# Patient Record
Sex: FEMALE | Race: WHITE | Employment: OTHER | RURAL
[De-identification: names, ages, dates, MRNs, and addresses within clinical notes are randomized per-mention and may not be internally consistent; named-entity substitution may affect disease eponyms.]

---

## 2017-02-24 ENCOUNTER — OFFICE VISIT (OUTPATIENT)
Dept: CARDIOLOGY CLINIC | Age: 82
End: 2017-02-24

## 2017-02-24 VITALS
RESPIRATION RATE: 14 BRPM | HEART RATE: 57 BPM | SYSTOLIC BLOOD PRESSURE: 128 MMHG | WEIGHT: 94 LBS | DIASTOLIC BLOOD PRESSURE: 64 MMHG | OXYGEN SATURATION: 99 % | HEIGHT: 62 IN | BODY MASS INDEX: 17.3 KG/M2

## 2017-02-24 DIAGNOSIS — I34.0 MITRAL VALVE INSUFFICIENCY, UNSPECIFIED ETIOLOGY: ICD-10-CM

## 2017-02-24 DIAGNOSIS — I48.20 CHRONIC ATRIAL FIBRILLATION (HCC): Primary | ICD-10-CM

## 2017-02-24 DIAGNOSIS — E78.00 PURE HYPERCHOLESTEROLEMIA: ICD-10-CM

## 2017-02-24 DIAGNOSIS — I10 ESSENTIAL HYPERTENSION: ICD-10-CM

## 2017-02-24 DIAGNOSIS — I11.9 BENIGN HYPERTENSIVE HEART DISEASE WITHOUT HEART FAILURE: ICD-10-CM

## 2017-02-24 DIAGNOSIS — I35.1 AORTIC VALVE INSUFFICIENCY, UNSPECIFIED ETIOLOGY: ICD-10-CM

## 2017-02-24 NOTE — PROGRESS NOTES
PATIENT ID VERIFIED WITH TWO PATIENT IDENTIFIERS. MEDICATION REVIEWED AND APPROVED BY DR. Brooke Courtney.

## 2017-02-24 NOTE — MR AVS SNAPSHOT
Visit Information Date & Time Provider Department Dept. Phone Encounter #  
 2/24/2017  1:20 PM Edi Brand, [address] [facility] Cardiology [facility] [phone] [id number] Your Appointments 8/25/2017  2:00 PM  
ESTABLISHED PATIENT with Edi Brand MD  
[address] [facility] Appt Note: 6 mo f/u $0cp 13[address] [facility] [address] [address] [phone]  
  
   
 [address] [place] [id number] Upcoming Health Maintenance Date Due DTaP/Tdap/Td series (1 - Tdap) 10/6/1953 ZOSTER VACCINE AGE 60> 10/6/1992 GLAUCOMA SCREENING Q2Y 10/6/1997 OSTEOPOROSIS SCREENING (DEXA) 10/6/1997 Pneumococcal 65+ Low/Medium Risk (1 of 2 - PCV13) 10/6/1997 MEDICARE YEARLY EXAM 10/6/1997 INFLUENZA AGE 9 TO ADULT 8/1/2016 Allergies as of 2/24/2017  Review Complete On: 2/24/2017 By: Edi Brand MD  
  
 Severity Noted Reaction Type Reactions Flagyl [Metronidazole]  12/04/2013    Nausea and Vomiting Penicillins  12/04/2013    Nausea and Vomiting Current Immunizations  Never Reviewed Name Date  
 TB Skin Test (PPD) Intradermal  Incomplete Not reviewed this visit You Were Diagnosed With   
  
 Codes Comments Chronic atrial fibrillation (HCC)    -  Primary ICD-10-CM: I89.7 ICD-9-CM: 427.31 Benign hypertensive heart disease without heart failure     ICD-10-CM: I11.9 ICD-9-CM: 402.10 Aortic valve insufficiency, unspecified etiology     ICD-10-CM: I35.1 ICD-9-CM: 424.1 Mitral valve insufficiency, unspecified etiology     ICD-10-CM: I34.0 ICD-9-CM: 424.0 Pure hypercholesterolemia     ICD-10-CM: E78.00 ICD-9-CM: 272.0 Essential hypertension     ICD-10-CM: I10 
ICD-9-CM: 401.9 Vitals BP  
  
  
  
  
  
 128/64 (BP 1 Location: Right arm, BP Patient Position: Sitting) BMI and BSA Data Body Mass Index Body Surface Area 17.19 kg/m 2 1.37 m 2 Preferred Pharmacy Pharmacy Name Phone [address], [address] [facility] AT [facility] OF  [address] & [address] MEM[address]adi Larsen [phone] Your Updated Medication List  
  
   
This list is accurate as of: 2/24/17  2:13 PM.  Always use your most recent med list.  
  
  
  
  
 ALIGN 4 mg Cap Generic drug:  Bifidobacterium Infantis Take  by mouth. atorvastatin 20 mg tablet Commonly known as:  LIPITOR Take 1 Tab by mouth daily. Azelastine 0.15 % (205.5 mcg) nasal spray Commonly known as:  ASTEPRO  
two (2) times a day. calcium carbonate 200 mg calcium (500 mg) Chew Commonly known as:  TUMS Take 1 Tab by mouth as needed. CLARITIN 10 mg tablet Generic drug:  loratadine Take 10 mg by mouth daily. EPIPEN 0.3 mg/0.3 mL injection Generic drug:  EPINEPHrine  
0.3 mg by IntraMUSCular route once as needed. fluorometholone 0.1 % ophthalmic suspension Commonly known as: [facility] Administer 1 Drop to both eyes two (2) times a day. levothyroxine 25 mcg tablet Commonly known as:  SYNTHROID Take 25 mcg by mouth two (2) times a day. LOPRESSOR PO Take 25 mg by mouth as needed. melatonin 3 mg tablet Take  by mouth nightly. metoprolol succinate 50 mg XL tablet Commonly known as:  TOPROL-XL Take 50 mg by mouth two (2) times a day. multivitamin tablet Commonly known as:  ONE A DAY Take 1 Tab by mouth daily. omeprazole 20 mg capsule Commonly known as:  PRILOSEC Take 20 mg by mouth daily. Takes 40mg a day OTHER  
AMITIZA TWO TABLETS, MIRALAX, CITRACEL FOR BOWELS  Currently taking Miralax only TYLENOL EXTRA STRENGTH 500 mg tablet Generic drug:  acetaminophen Take  by mouth every six (6) hours as needed for Pain. VIACTIV PO Take  by mouth nightly. VITAMIN D3 1,000 unit Cap Generic drug:  cholecalciferol Take  by mouth daily. Taking 5000 units VOLTAREN 1 % Gel Generic drug:  diclofenac Apply  to affected area four (4) times daily. XANAX 0.25 mg tablet Generic drug:  ALPRAZolam  
Take 0.5 mg by mouth nightly. XARELTO 15 mg Tab tablet Generic drug:  rivaroxaban Take  by mouth daily. We Performed the Following AMB POC EKG ROUTINE W/ 12 LEADS, INTER & REP [37737 CPT(R)] Introducing [facility] & HEALTH SERVICES! Navya Quinones introduces [employer] patient portal. Now you can access parts of your medical record, email your doctor's office, and request medication refills online. 1. In your internet browser, go to [email or web address] 2. Click on the First Time User? Click Here link in the Sign In box. You will see the New Member Sign Up page. 3. Enter your Intelli[employer] Access Code exactly as it appears below. You will not need to use this code after youve completed the sign-up process. If you do not sign up before the expiration date, you must request a new code. · [employer]elli[employer] Access Code: [email or web address] Expires: 5/25/2017  2:13 PM 
 
4. Enter the last four digits of your Social Security Number (xxxx) and Date of Birth (mm/dd/yyyy) as indicated and click Submit. You will be taken to the next sign-up page. 5. Create a IntelliChem ID. This will be your IntelliChem login ID and cannot be changed, so think of one that is secure and easy to remember. 6. Create a IntelliChem password. You can change your password at any time. 7. Enter your Password Reset Question and Answer. This can be used at a later time if you forget your password. 8. Enter your e-mail address. You will receive e-mail notification when new information is available in [address] 9. Click Sign Up. You can now view and download portions of your medical record. 10. Click the Download Summary menu link to download a portable copy of your medical information. If you have questions, please visit the Frequently Asked Questions section of the P[employer] website. Remember, [employer] is NOT to be used for urgent needs. For medical emergencies, dial 911. Now available from your iPhone and Android! Please provide this summary of care documentation to your next provider. Your primary care clinician is listed as Orlan Halsted. If you have any questions after today's visit, please call [phone].

## 2017-02-24 NOTE — PROGRESS NOTES
Corey Randall is a 80 y.o. female is here for routine f/u. No CV sx or complaints. Rate controlled afib on anticoag. Prior GI w/u. Had recent episode of AMS--seen by PCP and had CT ?MRI (Dr. Andry Sierra). The patient denies chest pain/ shortness of breath, orthopnea, PND, LE edema, palpitations, syncope, presyncope or fatigue. Patient Active Problem List    Diagnosis Date Noted    Constipation     Snoring     Carotid bruit 07/26/2012    Atrial fibrillation (HCC)     Aortic insufficiency     Benign hypertensive heart disease without heart failure     Pure hypercholesterolemia     Mitral insufficiency     Hypothyroid       Porfirio Blevins MD (Inactive)  Past Medical History:   Diagnosis Date    Anxiety disorder     Aortic insufficiency     Atrial fibrillation (HCC)     Warfarin stopped by PCP for lung lesion    Benign hypertensive heart disease without heart failure     Carotid bruit 7/26/2012    Constipation     Essential hypertension     Heart disease     Hypothyroid     Memory disorder     Mesothelioma ([facility] [address]     Mitral insufficiency     Pure hypercholesterolemia     Snoring     Unspecified cerebral artery occlusion with cerebral infarction [facility])       Past Surgical History:   Procedure Laterality Date    ECHO 2D ADULT  2009    mild LV systolic dysfunction, normal chamber dimensions, mild aortic regurgitation and mild mitral regurgitation. The ejection fraction was 45-50%.  HX APPENDECTOMY      HX CATARACT REMOVAL      HX TONSILLECTOMY       Allergies   Allergen Reactions    Flagyl [Metronidazole] Nausea and Vomiting    Penicillins Nausea and Vomiting      Family History   Problem Relation Age of Onset    Stroke Mother     Stroke Father       Social History     Social History    Marital status:      Spouse name: N/A    Number of children: N/A    Years of education: N/A     Occupational History    Not on file.      Social History Main Topics    Smoking status: Never Smoker    Smokeless tobacco: Not on file    Alcohol use No    Drug use: No    Sexual activity: Not on file     Other Topics Concern    Not on file     Social History Narrative      Current Outpatient Prescriptions   Medication Sig    calcium carbonate (TUMS) 200 mg calcium (500 mg) chew Take 1 Tab by mouth as needed.  acetaminophen (TYLENOL EXTRA STRENGTH) 500 mg tablet Take  by mouth every six (6) hours as needed for Pain.  Azelastine (ASTEPRO) 0.15 % (205.5 mcg) nasal spray two (2) times a day.  fluorometholone (FML) 0.1 % ophthalmic suspension Administer 1 Drop to both eyes three (3) times daily.  cranberry extract (AZO CRANBERRY) 450 mg tab Take  by mouth daily.  VIT A/VIT C/VIT E/ZINC/COPPER (ICAPS AREDS PO) Take  by mouth.  diclofenac (VOLTAREN) 1 % gel Apply  to affected area four (4) times daily.  EPINEPHrine (EPIPEN) 0.3 mg/0.3 mL injection 0.3 mg by IntraMUSCular route once as needed.  OTHER AMITIZA TWO TABLETS, MIRALAX, CITRACEL FOR BOWELS   Currently taking Miralax only    multivitamin (ONE A DAY) tablet Take 1 Tab by mouth daily.  loratadine (CLARITIN) 10 mg tablet Take 10 mg by mouth daily.  Bifidobacterium Infantis (ALIGN) 4 mg cap Take  by mouth.  rivaroxaban (XARELTO) 15 mg tab tablet Take  by mouth daily.  atorvastatin (LIPITOR) 20 mg tablet Take 1 Tab by mouth daily.  omeprazole (PRILOSEC) 20 mg capsule Take 20 mg by mouth daily. Takes 40mg a day    melatonin 3 mg tablet Take  by mouth.  metoprolol succinate (TOPROL-XL) 50 mg XL tablet Take 50 mg by mouth two (2) times a day.  Cholecalciferol, Vitamin D3, (VITAMIN D3) 1,000 unit cap Take  by mouth daily. Taking 5000 units    METOPROLOL TARTRATE (LOPRESSOR PO) Take 25 mg by mouth as needed.  ALPRAZolam (XANAX) 0.25 mg tablet Take 0.5 mg by mouth nightly.  CA CARBONATE/VITAMIN D3/VIT K (VIACTIV PO) Take  by mouth nightly.     levothyroxine (SYNTHROID) 25 mcg tablet Take 25 mcg by mouth two (2) times a day. No current facility-administered medications for this visit. Review of Symptoms:    CONST  No weight change. No fever, chills, sweats    ENT No visual changes, URI sx, sore throat    CV  See HPI   RESP  No cough, or sputum, wheezing. Also see HPI   GI  No abdominal pain or change in bowel habits. No heartburn or dysphagia. No melena or rectal bleeding.   No dysuria, urgency, frequency, hematuria   MSKEL  No joint pain, swelling. No muscle pain. SKIN  No rash or lesions. NEURO  No headache, syncope, or seizure. No weakness, loss of sensation, or paresthesias. PSYCH  No low mood or depression  No anxiety. HE/LYMPH  No easy bruising, abnormal bleeding, or enlarged glands. Physical ExamPhysical Exam:    Visit Vitals    /64 (BP 1 Location: Right arm, BP Patient Position: Sitting)    Pulse (!) 57    Resp 14    Ht 5' 2\" (1.575 m)    Wt 94 lb (42.6 kg)    SpO2 99%    BMI 17.19 kg/m2     Gen: NAD  HEENT:  PERRL, throat clear  Neck: no mass or thyromegaly, no JVD   Heart:  irregular,Nl S1S2,  I/VI murmur, no gallop or rub.   Lungs:  clear  Abdomen:   Soft, non-tender, bowel sounds are active.   Extremities:  No edema  Pulse: symmetric  Neuro: A&O times 3, WNL      Cardiographics    ECG: afib, rate controlled, NSST    CARDIAC TESTING    ECHO 12/2013:  EF 55-60%, sev LAE, mod FREDDIE, 1-2+ AI, mild MR, mild TR, RVSP 41      Labs:   No results found for: NA, K, CL, CO2, AGAP, GLU, BUN, CREA, BUCR, GFRAA, GFRNA, CA, TBIL, TBILI, GPT, SGOT, AP, TP, ALB, GLOB, AGRAT, ALT  No results found for: CPK, CPKX, CPX  No results found for: CHOL, CHOLX, CHLST, CHOLV, 403601, HDL, LDL, DLDL, LDLC, DLDLP, TGL, TGLX, TRIGL, [id number], TRIGP, CHHD, CHHDX  No results found for this or any previous visit.     Assessment:         Patient Active Problem List    Diagnosis Date Noted    Constipation     Snoring     Carotid bruit 07/26/2012    Atrial fibrillation ([facility] [address]     Aortic insufficiency     Benign hypertensive heart disease without heart failure     Pure hypercholesterolemia     Mitral insufficiency     Hypothyroid       No CV sx or complaints. Rate controlled afib on anticoag. Prior GI w/u neg, c/w IBS. Continues to see PCP. Had recent episode of AMS--seen by PCP and had CT ?MRI (Dr. Alexy Mcgee). Plan:     Doing well with no adverse cardiac symptoms. Lipids and labs followed by PCP. Continue current care and f/u in 6 months.      Carmen Martinez MD

## 2017-08-22 ENCOUNTER — OFFICE VISIT (OUTPATIENT)
Dept: CARDIOLOGY CLINIC | Age: 82
End: 2017-08-22

## 2017-08-22 VITALS
DIASTOLIC BLOOD PRESSURE: 76 MMHG | HEART RATE: 49 BPM | WEIGHT: 91.2 LBS | OXYGEN SATURATION: 100 % | SYSTOLIC BLOOD PRESSURE: 148 MMHG | HEIGHT: 62 IN | BODY MASS INDEX: 16.78 KG/M2 | RESPIRATION RATE: 16 BRPM

## 2017-08-22 DIAGNOSIS — I35.1 AORTIC VALVE REGURGITATION, UNSPECIFIED ETIOLOGY: ICD-10-CM

## 2017-08-22 DIAGNOSIS — I34.0 MITRAL VALVE INSUFFICIENCY, UNSPECIFIED ETIOLOGY: ICD-10-CM

## 2017-08-22 DIAGNOSIS — I48.20 CHRONIC ATRIAL FIBRILLATION (HCC): Primary | ICD-10-CM

## 2017-08-22 DIAGNOSIS — I11.9 BENIGN HYPERTENSIVE HEART DISEASE WITHOUT HEART FAILURE: ICD-10-CM

## 2017-08-22 DIAGNOSIS — I10 ESSENTIAL HYPERTENSION: ICD-10-CM

## 2017-08-22 RX ORDER — POLYETHYLENE GLYCOL 3350 17 G/17G
17 POWDER, FOR SOLUTION ORAL AS NEEDED
COMMUNITY

## 2017-08-22 NOTE — MR AVS SNAPSHOT
Visit Information Date & Time Provider Department Dept. Phone Encounter #  
 8/22/2017 11:40 AM Vika Galeano, [address] [facility] Cardiology [facility] [phone] [id number] Your Appointments 3/20/2018  3:00 PM  
ESTABLISHED PATIENT with Vika Galeano MD  
[address] - [facility]) Appt Note: 6 mo fu $0cp [address] [facility] [place] [id number] [phone]  
  
   
 [address] [place] [address] Upcoming Health Maintenance Date Due DTaP/Tdap/Td series (1 - Tdap) 10/6/1953 ZOSTER VACCINE AGE 60> 8/6/1992 GLAUCOMA SCREENING Q2Y 10/6/1997 OSTEOPOROSIS SCREENING (DEXA) 10/6/1997 Pneumococcal 65+ Low/Medium Risk (1 of 2 - PCV13) 10/6/1997 MEDICARE YEARLY EXAM 10/6/1997 INFLUENZA AGE 9 TO ADULT 8/1/2017 Allergies as of 8/22/2017  Review Complete On: 8/22/2017 By: Vika Galeano MD  
  
 Severity Noted Reaction Type Reactions Flagyl [Metronidazole]  12/04/2013    Nausea and Vomiting Penicillins  12/04/2013    Nausea and Vomiting Current Immunizations  Never Reviewed Name Date  
 TB Skin Test (PPD) Intradermal  Incomplete Not reviewed this visit You Were Diagnosed With   
  
 Codes Comments [facility]rial fibrillation, unspecified type ([facility]    -  Primary ICD-10-CM: I48.91 
ICD-9-CM: 427.31 Benign hypertensive heart disease without heart failure     ICD-10-CM: I11.9 ICD-9-CM: 402.10 Aortic valve regurgitation, unspecified etiology     ICD-10-CM: I35.1 ICD-9-CM: 424.1 Essential hypertension     ICD-10-CM: I10 
ICD-9-CM: 401.9 Mitral valve insufficiency, unspecified etiology     ICD-10-CM: I34.0 ICD-9-CM: 424.0 Vitals BP Pulse Resp Height(growth percentile) Weight(growth percentile) SpO2  
 148/76 (BP 1 Location: Right arm, BP Patient Position: Sitting) (!) 49 16 5' 2\" (1.575 m) 91 lb 3.2 oz (41.4 kg) 100% BMI Smoking Status 16.68 kg/m2 Never Smoker Vitals History BMI and BSA Data Body Mass Index Body Surface Area  
 16.68 kg/m 2 1.35 m 2 Preferred Pharmacy Pharmacy Name Phone [address], [address] AT [facility] OF  [address] & [address] [phone] Your Updated Medication List  
  
   
This list is accurate as of: 8/22/17 12:13 PM.  Always use your most recent med list.  
  
  
  
  
 ALIGN 4 mg Cap Generic drug:  Bifidobacterium Infantis Take  by mouth. atorvastatin 20 mg tablet Commonly known as:  LIPITOR Take 1 Tab by mouth daily. Azelastine 0.15 % (205.5 mcg) nasal spray Commonly known as:  ASTEPRO  
two (2) times a day. calcium carbonate 200 mg calcium (500 mg) Chew Commonly known as:  TUMS Take 1 Tab by mouth as needed. CITRACAL PO Take  by mouth. CLARITIN 10 mg tablet Generic drug:  loratadine Take 10 mg by mouth daily. EPIPEN 0.3 mg/0.3 mL injection Generic drug:  EPINEPHrine  
0.3 mg by IntraMUSCular route once as needed. FIBER PO Take  by mouth. fluorometholone 0.1 % ophthalmic suspension Commonly known as: [address] Administer 1 Drop to both eyes two (2) times a day. levothyroxine 25 mcg tablet Commonly known as:  SYNTHROID Take 50 mcg by mouth Daily (before breakfast). LOPRESSOR PO Take 25 mg by mouth as needed. melatonin 3 mg tablet Take  by mouth nightly. metoprolol succinate 50 mg XL tablet Commonly known as:  TOPROL-XL Take 50 mg by mouth two (2) times a day. MIRALAX 17 gram packet Generic drug:  polyethylene glycol Take 17 g by mouth daily. multivitamin tablet Commonly known as:  ONE A DAY Take 1 Tab by mouth daily. omeprazole 20 mg capsule Commonly known as:  PRILOSEC Take 20 mg by mouth daily. Takes 40mg a day OTHER  
AMITIZA one TABLET DAILY  
  
 TYLENOL EXTRA STRENGTH 500 mg tablet Generic drug:  acetaminophen Take  by mouth every six (6) hours as needed for Pain. VIACTIV PO Take  by mouth nightly. VITAMIN D3 1,000 unit Cap Generic drug:  cholecalciferol Take  by mouth daily. Taking 5000 units VOLTAREN 1 % Gel Generic drug:  diclofenac Apply  to affected area four (4) times daily. XANAX 0.25 mg tablet Generic drug:  ALPRAZolam  
Take 0.5 mg by mouth nightly. XARELTO 15 mg Tab tablet Generic drug:  rivaroxaban Take  by mouth daily. We Performed the Following AMB POC EKG ROUTINE W/ 12 LEADS, INTER & REP [20866 CPT(R)] Introducing [facility] & HEALTH SERVICES! Chema Lewis introduces [employer] patient portal. Now you can access parts of your medical record, email your doctor's office, and request medication refills online. 1. In your internet browser, go to [email or web address] 2. Click on the First Time User? Click Here link in the Sign In box. You will see the New Member Sign Up page. 3. Enter your Partly Access Code exactly as it appears below. You will not need to use this code after youve completed the sign-up process. If you do not sign up before the expiration date, you must request a new code. · [employer] Access Code: [email or web address] Expires: 11/20/2017 12:13 PM 
 
4. Enter the last four digits of your Social Security Number (xxxx) and Date of Birth (mm/dd/yyyy) as indicated and click Submit. You will be taken to the next sign-up page. 5. Create a [employer] ID. This will be your Partly login ID and cannot be changed, so think of one that is secure and easy to remember. 6. Create a Partly password. You can change your password at any time. 7. Enter your Password Reset Question and Answer. This can be used at a later time if you forget your password. 8. Enter your e-mail address. You will receive e-mail notification when new information is available in [address] 9. Click Sign Up. You can now view and download portions of your medical record. 10. Click the Download Summary menu link to download a portable copy of your medical information. If you have questions, please visit the Frequently Asked Questions section of the [employer] website. Remember, [employer] is NOT to be used for urgent needs. For medical emergencies, dial 911. Now available from your iPhone and Android! Please provide this summary of care documentation to your next provider. Your primary care clinician is listed as Jeremias Saez. If you have any questions after today's visit, please call [phone].

## 2017-08-22 NOTE — PROGRESS NOTES
Amy Pedro is a 80 y.o. female is here for routine f/u. No CV sx or complaints. Continues to see PCP. Rate controlled AFib on anticoag. Has had to take \"extra\" metoprolol tartrate on occasion for tachy. The patient denies chest pain/ shortness of breath, orthopnea, PND, LE edema, palpitations, syncope, presyncope or fatigue. Patient Active Problem List    Diagnosis Date Noted    Constipation     Snoring     Carotid bruit 07/26/2012    Atrial fibrillation (HCC)     Aortic insufficiency     Benign hypertensive heart disease without heart failure     Pure hypercholesterolemia     Mitral insufficiency     Hypothyroid       Prema Gonzales MD (Inactive)  Past Medical History:   Diagnosis Date    Anxiety disorder     Aortic insufficiency     Atrial fibrillation (HCC)     Warfarin stopped by PCP for lung lesion    Benign hypertensive heart disease without heart failure     Carotid bruit 7/26/2012    Constipation     Essential hypertension     Heart disease     Hypothyroid     Memory disorder     Mesothelioma ([facility] [address]     Mitral insufficiency     Pure hypercholesterolemia     Snoring     Unspecified cerebral artery occlusion with cerebral infarction [facility])       Past Surgical History:   Procedure Laterality Date    ECHO 2D ADULT  2009    mild LV systolic dysfunction, normal chamber dimensions, mild aortic regurgitation and mild mitral regurgitation. The ejection fraction was 45-50%.  HX APPENDECTOMY      HX CATARACT REMOVAL      HX TONSILLECTOMY       Allergies   Allergen Reactions    Flagyl [Metronidazole] Nausea and Vomiting    Penicillins Nausea and Vomiting      Family History   Problem Relation Age of Onset    Stroke Mother     Stroke Father       Social History     Social History    Marital status:      Spouse name: N/A    Number of children: N/A    Years of education: N/A     Occupational History    Not on file.      Social History Main Topics    Smoking status: Never Smoker    Smokeless tobacco: Not on file    Alcohol use No    Drug use: No    Sexual activity: Not on file     Other Topics Concern    Not on file     Social History Narrative      Current Outpatient Prescriptions   Medication Sig    PSYLLIUM SEED, WITH DEXTROSE, (FIBER PO) Take  by mouth.  polyethylene glycol (MIRALAX) 17 gram packet Take 17 g by mouth daily.  CALCIUM CITRATE (CITRACAL PO) Take  by mouth.  calcium carbonate (TUMS) 200 mg calcium (500 mg) chew Take 1 Tab by mouth as needed.  acetaminophen (TYLENOL EXTRA STRENGTH) 500 mg tablet Take  by mouth every six (6) hours as needed for Pain.  Azelastine (ASTEPRO) 0.15 % (205.5 mcg) nasal spray two (2) times a day.  fluorometholone (FML) 0.1 % ophthalmic suspension Administer 1 Drop to both eyes two (2) times a day.  diclofenac (VOLTAREN) 1 % gel Apply  to affected area four (4) times daily.  EPINEPHrine (EPIPEN) 0.3 mg/0.3 mL injection 0.3 mg by IntraMUSCular route once as needed.  OTHER AMITIZA one TABLET DAILY    multivitamin (ONE A DAY) tablet Take 1 Tab by mouth daily.  loratadine (CLARITIN) 10 mg tablet Take 10 mg by mouth daily.  Bifidobacterium Infantis (ALIGN) 4 mg cap Take  by mouth.  rivaroxaban (XARELTO) 15 mg tab tablet Take  by mouth daily.  atorvastatin (LIPITOR) 20 mg tablet Take 1 Tab by mouth daily.  omeprazole (PRILOSEC) 20 mg capsule Take 20 mg by mouth daily. Takes 40mg a day    melatonin 3 mg tablet Take  by mouth nightly.  metoprolol succinate (TOPROL-XL) 50 mg XL tablet Take 50 mg by mouth two (2) times a day.  Cholecalciferol, Vitamin D3, (VITAMIN D3) 1,000 unit cap Take  by mouth daily. Taking 5000 units    METOPROLOL TARTRATE (LOPRESSOR PO) Take 25 mg by mouth as needed.  ALPRAZolam (XANAX) 0.25 mg tablet Take 0.5 mg by mouth nightly.  CA CARBONATE/VITAMIN D3/VIT K (VIACTIV PO) Take  by mouth nightly.     levothyroxine (SYNTHROID) 25 mcg tablet Take 50 mcg by mouth Daily (before breakfast). No current facility-administered medications for this visit. Review of Symptoms:    CONST  No weight change. No fever, chills, sweats    ENT No visual changes, URI sx, sore throat    CV  See HPI   RESP  No cough, or sputum, wheezing. Also see HPI   GI  No abdominal pain or change in bowel habits. No heartburn or dysphagia. No melena or rectal bleeding.   No dysuria, urgency, frequency, hematuria   MSKEL  No joint pain, swelling. No muscle pain. SKIN  No rash or lesions. NEURO  No headache, syncope, or seizure. No weakness, loss of sensation, or paresthesias. PSYCH  No low mood or depression  No anxiety. HE/LYMPH  No easy bruising, abnormal bleeding, or enlarged glands. Physical ExamPhysical Exam:    Visit Vitals    Resp 16    Ht 5' 2\" (1.575 m)    Wt 91 lb 3.2 oz (41.4 kg)    BMI 16.68 kg/m2     Gen: NAD  HEENT:  PERRL, throat clear  Neck: no adenopathy, no thyromegaly, no JVD   Heart:  irregular,Nl S1S2,  II/VI murmur, no gallop or rub.   Lungs:  clear  Abdomen:   Soft, non-tender, bowel sounds are active.   Extremities:  No edema  Pulse: symmetric  Neuro: A&O times 3, No focal neuro deficits    Cardiographics    ECG:  afib 49, LVH, NSST    CARDIAC TESTING    ECHO 12/2013:  EF 55-60%, sev LAE, mod FREDDIE, 1-2+ AI, mild MR, mild TR, RVSP 41     ECHO 4/2015 EF 55-60, LAE, mild AI.MR/TR. Assessment:         Patient Active Problem List    Diagnosis Date Noted    Constipation     Snoring     Carotid bruit 07/26/2012    Atrial fibrillation (HCC)     Aortic insufficiency     Benign hypertensive heart disease without heart failure     Pure hypercholesterolemia     Mitral insufficiency     Hypothyroid       No CV sx or complaints. Continues to see PCP. Rate controlled AFib on anticoag. Has had to take \"extra\" metoprolol tartrate on occasion for tachy. Plan:     Doing well with no adverse cardiac symptoms. Home monitoring BP. Lipids and labs followed by PCP. Continue current care and f/u in 6 months.     Chevy Trevino MD

## 2018-03-20 ENCOUNTER — OFFICE VISIT (OUTPATIENT)
Dept: CARDIOLOGY CLINIC | Age: 83
End: 2018-03-20

## 2018-03-20 VITALS
HEART RATE: 52 BPM | HEIGHT: 63 IN | BODY MASS INDEX: 17.36 KG/M2 | SYSTOLIC BLOOD PRESSURE: 156 MMHG | WEIGHT: 98 LBS | OXYGEN SATURATION: 100 % | DIASTOLIC BLOOD PRESSURE: 80 MMHG | RESPIRATION RATE: 14 BRPM

## 2018-03-20 DIAGNOSIS — I35.1 AORTIC VALVE INSUFFICIENCY, ETIOLOGY OF CARDIAC VALVE DISEASE UNSPECIFIED: ICD-10-CM

## 2018-03-20 DIAGNOSIS — I48.20 CHRONIC ATRIAL FIBRILLATION (HCC): Primary | ICD-10-CM

## 2018-03-20 DIAGNOSIS — I34.0 MITRAL VALVE INSUFFICIENCY, UNSPECIFIED ETIOLOGY: ICD-10-CM

## 2018-03-20 DIAGNOSIS — I11.9 BENIGN HYPERTENSIVE HEART DISEASE WITHOUT HEART FAILURE: ICD-10-CM

## 2018-03-20 DIAGNOSIS — I10 ESSENTIAL HYPERTENSION: ICD-10-CM

## 2018-03-20 RX ORDER — AMLODIPINE BESYLATE 5 MG/1
5 TABLET ORAL DAILY
Qty: 90 TAB | Refills: 3 | Status: SHIPPED | OUTPATIENT
Start: 2018-03-20 | End: 2019-03-08 | Stop reason: SDUPTHER

## 2018-03-20 RX ORDER — IPRATROPIUM BROMIDE 21 UG/1
2 SPRAY, METERED NASAL DAILY
COMMUNITY
End: 2022-09-14

## 2018-03-20 RX ORDER — AMLODIPINE BESYLATE 2.5 MG/1
TABLET ORAL DAILY
COMMUNITY
End: 2018-03-20 | Stop reason: SDUPTHER

## 2018-03-20 NOTE — MR AVS SNAPSHOT
[facility] 
 
 
 [address] [facility] [address] [id number] [phone] Patient: Margaret Middleton MRN: [id number] Visit Information Date & Time Provider Department Dept. Phone Encounter #  
 3/20/2018  3:00 PM Kelly Velasquez, [address] [place] [address] Cardiology [facility] [phone] [id number] Follow-up Instructions Return in about 6 months (around 2018). Follow-up and Disposition History Upcoming Health Maintenance Date Due DTaP/Tdap/Td series (1 - Tdap) 10/6/1953 ZOSTER VACCINE AGE 60> 1992 GLAUCOMA SCREENING Q2Y 10/6/1997 Bone Densitometry (Dexa) Screening 10/6/1997 Pneumococcal 65+ Low/Medium Risk (1 of 2 - PCV13) 10/6/1997 MEDICARE YEARLY EXAM 3/20/2018 Allergies as of 3/20/2018  Review Complete On: 3/20/2018 By: Kelly Velasquez MD  
  
 Severity Noted Reaction Type Reactions Flagyl [Metronidazole]  2013    Nausea and Vomiting Penicillins  2013    Nausea and Vomiting Current Immunizations  Never Reviewed Name Date  
 TB Skin Test (PPD) Intradermal  Incomplete Not reviewed this visit You Were Diagnosed With   
  
 Codes Comments Chronic atrial fibrillation (HCC)    -  Primary ICD-10-CM: Z90.5 ICD-9-CM: 427.31 Essential hypertension     ICD-10-CM: I10 
ICD-9-CM: 401.9 Benign hypertensive heart disease without heart failure     ICD-10-CM: I11.9 ICD-9-CM: 402.10 Mitral valve insufficiency, unspecified etiology     ICD-10-CM: I34.0 ICD-9-CM: 424.0 Aortic valve insufficiency, etiology of cardiac valve disease unspecified     ICD-10-CM: I35.1 ICD-9-CM: 424.1 Vitals BP Pulse Resp Height(growth percentile) Weight(growth percentile) SpO2  
 156/80 (BP 1 Location: Right arm, BP Patient Position: Sitting) (!) 52 14 5' 3\" (1.6 m) 98 lb (44.5 kg) 100% BMI Smoking Status 17.36 kg/m2 Never Smoker Vitals History BMI and BSA Data Body Mass Index Body Surface Area  
 17.36 kg/m 2 1.41 m 2 Preferred Pharmacy Pharmacy Name Phone [address] [facility] [phone] Your Updated Medication List  
  
   
This list is accurate as of 3/20/18  3:40 PM.  Always use your most recent med list.  
  
  
  
  
 ALIGN 4 mg Cap Generic drug:  Bifidobacterium Infantis Take  by mouth daily. amLODIPine 5 mg tablet Commonly known as:  Claudell Luzmaria Take 1 Tab by mouth daily. atorvastatin 20 mg tablet Commonly known as:  LIPITOR Take 1 Tab by mouth daily. Azelastine 0.15 % (205.5 mcg) nasal spray Commonly known as:  ASTEPRO  
two (2) times a day. calcium carbonate 200 mg calcium (500 mg) Chew Commonly known as:  TUMS Take 1 Tab by mouth as needed. CLARITIN 10 mg tablet Generic drug:  loratadine Take 10 mg by mouth daily. EPIPEN 0.3 mg/0.3 mL injection Generic drug:  EPINEPHrine  
0.3 mg by IntraMUSCular route once as needed. FIBER PO Take  by mouth every fourty-eight (48) hours. fluorometholone 0.1 % ophthalmic suspension Commonly known as: [facility] Administer 1 Drop to both eyes daily. ipratropium 0.03 % nasal spray Commonly known as:  ATROVENT  
2 Sprays every twelve (12) hours. levothyroxine 25 mcg tablet Commonly known as:  SYNTHROID Take 50 mcg by mouth Daily (before breakfast). LOPRESSOR PO Take 25 mg by mouth as needed. Takes if blood pressure is in excess of 628 systolic  
  
 melatonin 3 mg tablet Take  by mouth nightly. Takes for 3 weeks then stops a week before restarting  
  
 metoprolol succinate 50 mg XL tablet Commonly known as:  TOPROL-XL Take 50 mg by mouth two (2) times a day. MIRALAX 17 gram packet Generic drug:  polyethylene glycol Take 17 g by mouth daily. multivitamin tablet Commonly known as:  ONE A DAY Take 1 Tab by mouth daily. omeprazole 20 mg capsule Commonly known as:  PRILOSEC Take 20 mg by mouth daily. Takes 40mg a day OTHER  
AMITIZA one TABLET DAILY [place] Perish OP Apply  to eye three (3) times daily. TYLENOL EXTRA STRENGTH 500 mg tablet Generic drug:  acetaminophen Take  by mouth every six (6) hours as needed for Pain. VIACTIV PO Take  by mouth nightly. VITAMIN D3 1,000 unit Cap Generic drug:  cholecalciferol Take  by mouth daily. Taking 5000 units VOLTAREN 1 % Gel Generic drug:  diclofenac Apply  to affected area four (4) times daily. XANAX 0.25 mg tablet Generic drug:  ALPRAZolam  
Take 0.5 mg by mouth nightly. XARELTO 15 mg Tab tablet Generic drug:  rivaroxaban Take  by mouth daily. Prescriptions Sent to Pharmacy Refills  
 amLODIPine (NORVASC) 5 mg tablet 3 Sig: Take 1 Tab by mouth daily. Class: Normal  
 Pharmacy: [address], [address] Ph #: [phone] Route: Oral  
  
We Performed the Following AMB POC EKG ROUTINE W/ 12 LEADS, INTER & REP [01177 CPT(R)] Follow-up Instructions Return in about 6 months (around 9/20/2018). Introducing hospitals & HEALTH SERVICES! Carmen Ornelas introduces [employer] patient portal. Now you can access parts of your medical record, email your doctor's office, and request medication refills online. 1. In your internet browser, go to [email or web address] 2. Click on the First Time User? Click Here link in the Sign In box. You will see the New Member Sign Up page. 3. Enter your [employer] Access Code exactly as it appears below. You will not need to use this code after youve completed the sign-up process. If you do not sign up before the expiration date, you must request a new code. · [employer] Access Code: [email or web address] Expires: 6/18/2018  3:40 PM 
 
 4. Enter the last four digits of your Social Security Number (xxxx) and Date of Birth (mm/dd/yyyy) as indicated and click Submit. You will be taken to the next sign-up page. 5. Create a [employer] ID. This will be your [employer] login ID and cannot be changed, so think of one that is secure and easy to remember. 6. Create a [employer] password. You can change your password at any time. 7. Enter your Password Reset Question and Answer. This can be used at a later time if you forget your password. 8. Enter your e-mail address. You will receive e-mail notification when new information is available in [address] 9. Click Sign Up. You can now view and download portions of your medical record. 10. Click the Download Summary menu link to download a portable copy of your medical information. If you have questions, please visit the Frequently Asked Questions section of the [employer] website. Remember, [employer] is NOT to be used for urgent needs. For medical emergencies, dial 911. Now available from your iPhone and Android! Please provide this summary of care documentation to your next provider. Your primary care clinician is listed as Dock Rob. If you have any questions after today's visit, please call [phone].

## 2018-03-20 NOTE — PROGRESS NOTES
PATIENT ID VERIFIED WITH TWO PATIENT IDENTIFIERS. PATIENT MEDICATIONS REVIEWED AND APPROVED BY DR. Adam Aquino. MEDICATIONS THAT WERE REMOVED FROM THIS VISIT HAVE BEEN APPROVED BY DR. Adam Aquino. Chief Complaint   Patient presents with    Irregular Heart Beat     6 mo f/u    Hypertension       1. Have you been to the ER, urgent care clinic since your last visit? Hospitalized since your last visit? no    2. Have you seen or consulted any other health care providers outside of the [address] [place] [address] since your last visit?   Yes Dr. Aleksey Jay for routine follow up, [facility], [address]  for IBS follow up

## 2018-03-20 NOTE — PROGRESS NOTES
Naresh Emerson is a 80 y.o. female is here for routine f/u. No CV sx or complaints. Continues to see PCP. Rate controlled AFib on anticoag. Has had to take \"extra\" metoprolol tartrate on occasion for tachy. BP elevated, started on Amlodipine 2.5mg for this--started one month ago by Dr. Venice Parsons. The patient denies chest pain/ shortness of breath, orthopnea, PND, LE edema, palpitations, syncope, presyncope or fatigue. Patient Active Problem List    Diagnosis Date Noted    Essential hypertension 03/20/2018    Constipation     Snoring     Carotid bruit 07/26/2012    Atrial fibrillation (HCC)     Aortic insufficiency     Benign hypertensive heart disease without heart failure     Pure hypercholesterolemia     Mitral insufficiency     Hypothyroid       Nikunj Sinclair MD (Inactive)  Past Medical History:   Diagnosis Date    Anxiety disorder     Aortic insufficiency     Atrial fibrillation (HCC)     Warfarin stopped by PCP for lung lesion    Benign hypertensive heart disease without heart failure     Carotid bruit 7/26/2012    Constipation     Essential hypertension     Heart disease     HTN (hypertension)     Hypothyroid     Memory disorder     Mesothelioma ([address]     Mitral insufficiency     Pure hypercholesterolemia     Snoring     Unspecified cerebral artery occlusion with cerebral infarction       Past Surgical History:   Procedure Laterality Date    ECHO 2D ADULT  2009    mild LV systolic dysfunction, normal chamber dimensions, mild aortic regurgitation and mild mitral regurgitation. The ejection fraction was 45-50%.     HX APPENDECTOMY      HX CATARACT REMOVAL      HX TONSILLECTOMY       Allergies   Allergen Reactions    Flagyl [Metronidazole] Nausea and Vomiting    Penicillins Nausea and Vomiting      Family History   Problem Relation Age of Onset    Stroke Mother     Stroke Father       Social History     Social History    Marital status:      Spouse name: N/A    Number of children: N/A    Years of education: N/A     Occupational History    Not on file. Social History Main Topics    Smoking status: Never Smoker    Smokeless tobacco: Never Used    Alcohol use No    Drug use: No    Sexual activity: Not on file     Other Topics Concern    Not on file     Social History Narrative      Current Outpatient Prescriptions   Medication Sig    amLODIPine (NORVASC) 2.5 mg tablet Take  by mouth daily.  CARBOXYMETHYLCELLULOSE SODIUM (THERATEARS OP) Apply  to eye three (3) times daily.  ipratropium (ATROVENT) 0.03 % nasal spray 2 Sprays every twelve (12) hours.  PSYLLIUM SEED, WITH DEXTROSE, (FIBER PO) Take  by mouth every fourty-eight (48) hours.  polyethylene glycol (MIRALAX) 17 gram packet Take 17 g by mouth daily.  calcium carbonate (TUMS) 200 mg calcium (500 mg) chew Take 1 Tab by mouth as needed.  acetaminophen (TYLENOL EXTRA STRENGTH) 500 mg tablet Take  by mouth every six (6) hours as needed for Pain.  Azelastine (ASTEPRO) 0.15 % (205.5 mcg) nasal spray two (2) times a day.  fluorometholone (FML) 0.1 % ophthalmic suspension Administer 1 Drop to both eyes daily.  diclofenac (VOLTAREN) 1 % gel Apply  to affected area four (4) times daily.  OTHER AMITIZA one TABLET DAILY    multivitamin (ONE A DAY) tablet Take 1 Tab by mouth daily.  loratadine (CLARITIN) 10 mg tablet Take 10 mg by mouth daily.  Bifidobacterium Infantis (ALIGN) 4 mg cap Take  by mouth daily.  rivaroxaban (XARELTO) 15 mg tab tablet Take  by mouth daily.  atorvastatin (LIPITOR) 20 mg tablet Take 1 Tab by mouth daily.  omeprazole (PRILOSEC) 20 mg capsule Take 20 mg by mouth daily. Takes 40mg a day    melatonin 3 mg tablet Take  by mouth nightly. Takes for 3 weeks then stops a week before restarting    metoprolol succinate (TOPROL-XL) 50 mg XL tablet Take 50 mg by mouth two (2) times a day.     Cholecalciferol, Vitamin D3, (VITAMIN D3) 1,000 unit cap Take by mouth daily. Taking 5000 units    METOPROLOL TARTRATE (LOPRESSOR PO) Take 25 mg by mouth as needed. Takes if blood pressure is in excess of 457 systolic    ALPRAZolam (XANAX) 0.25 mg tablet Take 0.5 mg by mouth nightly.  CA CARBONATE/VITAMIN D3/VIT K (VIACTIV PO) Take  by mouth nightly.  levothyroxine (SYNTHROID) 25 mcg tablet Take 50 mcg by mouth Daily (before breakfast).  EPINEPHrine (EPIPEN) 0.3 mg/0.3 mL injection 0.3 mg by IntraMUSCular route once as needed. No current facility-administered medications for this visit. Review of Symptoms:    CONST  No weight change. No fever, chills, sweats    ENT No visual changes, URI sx, sore throat    CV  See HPI   RESP  No cough, or sputum, wheezing. Also see HPI   GI  No abdominal pain or change in bowel habits. No heartburn or dysphagia. No melena or rectal bleeding.   No dysuria, urgency, frequency, hematuria   MSKEL  No joint pain, swelling. No muscle pain. SKIN  No rash or lesions. NEURO  No headache, syncope, or seizure. No weakness, loss of sensation, or paresthesias. PSYCH  No low mood or depression  No anxiety. HE/LYMPH  No easy bruising, abnormal bleeding, or enlarged glands.         Physical ExamPhysical Exam:    Visit Vitals    /80 (BP 1 Location: Right arm, BP Patient Position: Sitting)    Pulse (!) 52    Resp 14    Ht 5' 3\" (1.6 m)    Wt 98 lb (44.5 kg)    SpO2 100%    BMI 17.36 kg/m2     Gen: NAD  HEENT:  PERRL, throat clear  Neck: no adenopathy, no thyromegaly, no JVD   Heart:  Regular,Nl S1S2,  no murmur, gallop or rub.   Lungs:  clear  Abdomen:   Soft, non-tender, bowel sounds are active.   Extremities:  No edema  Pulse: symmetric  Neuro: A&O times 3, No focal neuro deficits    Cardiographics    ECG: afib HR 73, LVH    Labs:   Lab Results   Component Value Date/Time    Sodium 134 (L) 11/03/2017 12:15 AM    Potassium 3.7 11/03/2017 12:15 AM    Chloride 98 11/03/2017 12:15 AM    CO2 27 11/03/2017 12:15 AM    Anion gap 9 11/03/2017 12:15 AM    Glucose 87 11/03/2017 12:15 AM    BUN 16 11/03/2017 12:15 AM    Creatinine 0.74 11/03/2017 12:15 AM    BUN/Creatinine ratio 22 (H) 11/03/2017 12:15 AM    GFR est AA >60 11/03/2017 12:15 AM    GFR est non-AA >60 11/03/2017 12:15 AM    Calcium 9.5 11/03/2017 12:15 AM    Bilirubin, total 0.4 11/03/2017 12:15 AM    AST (SGOT) 33 11/03/2017 12:15 AM    Alk. phosphatase 116 11/03/2017 12:15 AM    Protein, total 8.4 (H) 11/03/2017 12:15 AM    Albumin 3.8 11/03/2017 12:15 AM    Globulin 4.6 (H) 11/03/2017 12:15 AM    A-G Ratio 0.8 (L) 11/03/2017 12:15 AM    ALT (SGPT) 44 11/03/2017 12:15 AM     Lab Results   Component Value Date/Time    CK 91 11/03/2017 12:15 AM       Assessment:         Patient Active Problem List    Diagnosis Date Noted    Essential hypertension 03/20/2018    Constipation     Snoring     Carotid bruit 07/26/2012    Atrial fibrillation (HCC)     Aortic insufficiency     Benign hypertensive heart disease without heart failure     Pure hypercholesterolemia     Mitral insufficiency     Hypothyroid      No CV sx or complaints. Continues to see PCP. Rate controlled AFib on anticoag. Has had to take \"extra\" metoprolol tartrate on occasion for tachy. BP elevated, started on Amlodipine 2.5mg for this--started one month ago by Dr. Isak Paul. Plan:     Doing well with no adverse cardiac symptoms. BP elevated--multiple readings at home despite addition of the amlodipine 2.5  Will increase the amlodipine to 5mg every day, continue home BP readings  Continue Metoprolol  F/u with Dr. Isak Paul one week as planned   Lipids and labs followed by PCP. f/u in 6 months.     Seema Baker MD

## 2018-10-01 ENCOUNTER — OFFICE VISIT (OUTPATIENT)
Dept: CARDIOLOGY CLINIC | Age: 83
End: 2018-10-01

## 2018-10-01 VITALS
SYSTOLIC BLOOD PRESSURE: 160 MMHG | DIASTOLIC BLOOD PRESSURE: 74 MMHG | RESPIRATION RATE: 12 BRPM | OXYGEN SATURATION: 100 % | HEART RATE: 55 BPM | HEIGHT: 63 IN | WEIGHT: 101 LBS | BODY MASS INDEX: 17.89 KG/M2

## 2018-10-01 DIAGNOSIS — I34.0 MITRAL VALVE INSUFFICIENCY, UNSPECIFIED ETIOLOGY: ICD-10-CM

## 2018-10-01 DIAGNOSIS — I11.9 BENIGN HYPERTENSIVE HEART DISEASE WITHOUT HEART FAILURE: ICD-10-CM

## 2018-10-01 DIAGNOSIS — I35.1 AORTIC VALVE INSUFFICIENCY, ETIOLOGY OF CARDIAC VALVE DISEASE UNSPECIFIED: ICD-10-CM

## 2018-10-01 DIAGNOSIS — I48.20 CHRONIC ATRIAL FIBRILLATION (HCC): Primary | ICD-10-CM

## 2018-10-01 DIAGNOSIS — I10 ESSENTIAL HYPERTENSION: ICD-10-CM

## 2018-10-01 NOTE — MR AVS SNAPSHOT
[address] 
 
 
 [address] [id number] [phone] Patient: Wei Paiz MRN: [id number] TBR:[id number] Visit Information Date & Time Provider Department Dept. Phone Encounter #  
 10/1/2018 11:00 AM Elena Dos Santos, [address] [place] [address] Cardiology [facility] [phone] [id number] Follow-up Instructions Return in about 6 months (around 4/1/2019). Follow-up and Disposition History Your Appointments 10/1/2019 11:00 AM  
ESTABLISHED PATIENT with Elena Dos Santos MD  
[address] [place] [facility] [address] [place] [address] [facility]) Appt Note: $0CP 10/4/18ksr / 1 year follow up  
 [address] [facility] [id number] [phone]  
  
   
 [address] [place] [address] Upcoming Health Maintenance Date Due DTaP/Tdap/Td series (1 - Tdap) 10/6/1953 Shingrix Vaccine Age 50> (1 of 2) 10/6/1982 GLAUCOMA SCREENING Q2Y 10/6/1997 Bone Densitometry (Dexa) Screening 10/6/1997 Pneumococcal 65+ Low/Medium Risk (1 of 2 - PCV13) 10/6/1997 MEDICARE YEARLY EXAM 3/20/2018 Influenza Age 5 to Adult 8/1/2018 Allergies as of 10/1/2018  Review Complete On: 10/1/2018 By: Elena Dos Santos MD  
  
 Severity Noted Reaction Type Reactions Flagyl [Metronidazole]  12/04/2013    Nausea and Vomiting Penicillins  12/04/2013    Nausea and Vomiting Current Immunizations  Never Reviewed Name Date  
 TB Skin Test (PPD) Intradermal  Incomplete Not reviewed this visit You Were Diagnosed With   
  
 Codes Comments Chronic atrial fibrillation (HCC)    -  Primary ICD-10-CM: H99.6 ICD-9-CM: 427.31 Essential hypertension     ICD-10-CM: I10 
ICD-9-CM: 401.9 Benign hypertensive heart disease without heart failure     ICD-10-CM: I11.9 ICD-9-CM: 402.10 Mitral valve insufficiency, unspecified etiology     ICD-10-CM: I34.0 ICD-9-CM: 424.0 Aortic valve insufficiency, etiology of cardiac valve disease unspecified     ICD-10-CM: I35.1 ICD-9-CM: 424.1 Vitals BP Pulse Resp Height(growth percentile) Weight(growth percentile) SpO2  
 160/74 (BP 1 Location: Left arm, BP Patient Position: Sitting) (!) 55 12 5' 3\" (1.6 m) 101 lb (45.8 kg) 100% BMI Smoking Status 17.89 kg/m2 Never Smoker BMI and BSA Data Body Mass Index Body Surface Area  
 17.89 kg/m 2 1.43 m 2 Preferred Pharmacy Pharmacy Name Phone Bernardo Tobias, [facility] [phone] Your Updated Medication List  
  
   
This list is accurate as of 10/1/18 11:33 AM.  Always use your most recent med list. amLODIPine 5 mg tablet Commonly known as:  Katharyn Buzzard Take 1 Tab by mouth daily. atorvastatin 20 mg tablet Commonly known as:  LIPITOR Take 1 Tab by mouth daily. calcium carbonate 200 mg calcium (500 mg) Chew Commonly known as:  TUMS Take 1 Tab by mouth as needed. CLARITIN 10 mg tablet Generic drug:  loratadine Take 10 mg by mouth daily. EPIPEN 0.3 mg/0.3 mL injection Generic drug:  EPINEPHrine  
0.3 mg by IntraMUSCular route once as needed. fluorometholone 0.1 % ophthalmic suspension Commonly known as: [facility] Administer 1 Drop to both eyes daily. ipratropium 0.03 % nasal spray Commonly known as:  ATROVENT  
2 Sprays daily. levothyroxine 25 mcg tablet Commonly known as:  SYNTHROID Take 50 mcg by mouth Daily (before breakfast). LOPRESSOR PO Take 25 mg by mouth as needed. Takes if blood pressure is in excess of 524 systolic  
  
 melatonin 3 mg tablet Take  by mouth nightly. Takes for 3 weeks then stops a week before restarting  
  
 metoprolol succinate 50 mg XL tablet Commonly known as:  TOPROL-XL Take 50 mg by mouth two (2) times a day. MIRALAX 17 gram packet Generic drug:  polyethylene glycol Take 17 g by mouth as needed. multivitamin tablet Commonly known as:  ONE A DAY Take 1 Tab by mouth daily. omeprazole 20 mg capsule Commonly known as:  PRILOSEC Take 40 mg by mouth daily. Takes 40mg a day OTHER  
AMITIZA one TABLET DAILY  
  
 OTHER Fiber Well takes 2 gummies once a day OTHER FiberCon 1/2 tablet daily Orrspelsv 7 OP Apply  to eye three (3) times daily. TYLENOL EXTRA STRENGTH 500 mg tablet Generic drug:  acetaminophen Take  by mouth every six (6) hours as needed for Pain. VIACTIV PO Take  by mouth nightly. VITAMIN D3 1,000 unit Cap Generic drug:  cholecalciferol Take  by mouth daily. Taking 5000 units VOLTAREN 1 % Gel Generic drug:  diclofenac Apply  to affected area as needed. XANAX 0.25 mg tablet Generic drug:  ALPRAZolam  
Take 0.5 mg by mouth nightly. XARELTO 15 mg Tab tablet Generic drug:  rivaroxaban Take  by mouth daily. We Performed the Following AMB POC EKG ROUTINE W/ 12 LEADS, INTER & REP [77961 CPT(R)] Follow-up Instructions Return in about 6 months (around 4/1/2019). To-Do List   
 Around 10/04/2018 ECHO:  2D ECHO COMPLETE ADULT (TTE) W OR WO CONTR Introducing [facility]! [facility] introduces [employer] patient portal. Now you can access parts of your medical record, email your doctor's office, and request medication refills online. 1. In your internet browser, go to [email or web address]. [employer] 2. Click on the First Time User? Click Here link in the Sign In box. You will see the New Member Sign Up page. 3. Enter your [employer] Access Code exactly as it appears below. You will not need to use this code after youve completed the sign-up process. If you do not sign up before the expiration date, you must request a new code.  
 
· [employer] Access Code: [email or web address] 
 Expires: 12/2/2018 10:44 AM 
 
4. Enter the last four digits of your Social Security Number (xxxx) and Date of Birth (mm/dd/yyyy) as indicated and click Submit. You will be taken to the next sign-up page. 5. Create a [employer] ID. This will be your [employer] login ID and cannot be changed, so think of one that is secure and easy to remember. 6. Create a [employer] password. You can change your password at any time. 7. Enter your Password Reset Question and Answer. This can be used at a later time if you forget your password. 8. Enter your e-mail address. You will receive e-mail notification when new information is available in [address] 9. Click Sign Up. You can now view and download portions of your medical record. 10. Click the Download Summary menu link to download a portable copy of your medical information. If you have questions, please visit the Frequently Asked Questions section of the [employer] website. Remember, [employer] is NOT to be used for urgent needs. For medical emergencies, dial 911. Now available from your iPhone and Android! Please provide this summary of care documentation to your next provider. Your primary care clinician is listed as Zoie Peterson. If you have any questions after today's visit, please call [phone].

## 2018-10-01 NOTE — PROGRESS NOTES
PATIENT ID VERIFIED WITH TWO PATIENT IDENTIFIERS. PATIENT MEDICATIONS REVIEWED AND APPROVED BY DR. Niels Blizzard. MEDICATIONS THAT WERE REMOVED FROM THIS VISIT HAVE BEEN APPROVED BY DR. Niels Blizzard. Chief Complaint   Patient presents with    Irregular Heart Beat     6 mo f/u    Hypertension       1. Have you been to the ER, urgent care clinic since your last visit? Hospitalized since your last visit? YES [facility] ER for eye bleed August 2018    2. Have you seen or consulted any other health care providers outside of the [address] [place] [address] since your last visit?   Yes PCP Dr. Guy Emmanuel   2 weeks ago for follow up on eye bleed

## 2018-10-01 NOTE — PROGRESS NOTES
Jostin Fischer is a 80 y.o. female is here for routine f/u.  No CV sx or complaints.  Continues to see PCP. Billy Bey controlled AFib on anticoag.  Has had to take \"extra\" metoprolol tartrate on occasion for tachy. BP elevated, started on Amlodipine. The patient denies chest pain/ shortness of breath, orthopnea, PND, LE edema, palpitations, syncope, presyncope or fatigue. Patient Active Problem List    Diagnosis Date Noted    Essential hypertension 03/20/2018    Constipation     Snoring     Carotid bruit 07/26/2012    Atrial fibrillation (HCC)     Aortic insufficiency     Benign hypertensive heart disease without heart failure     Pure hypercholesterolemia     Mitral insufficiency     Hypothyroid       Zoie Peterson MD (Inactive)  Past Medical History:   Diagnosis Date    Anxiety disorder     Aortic insufficiency     Atrial fibrillation (HCC)     Warfarin stopped by PCP for lung lesion    Benign hypertensive heart disease without heart failure     Carotid bruit 7/26/2012    Constipation     Essential hypertension     Heart disease     HTN (hypertension)     Hypothyroid     Memory disorder     Mesothelioma ([address]     Mitral insufficiency     Pure hypercholesterolemia     Snoring     Unspecified cerebral artery occlusion with cerebral infarction       Past Surgical History:   Procedure Laterality Date    ECHO 2D ADULT  2009    mild LV systolic dysfunction, normal chamber dimensions, mild aortic regurgitation and mild mitral regurgitation. The ejection fraction was 45-50%.     HX APPENDECTOMY      HX CATARACT REMOVAL      HX TONSILLECTOMY       Allergies   Allergen Reactions    Flagyl [Metronidazole] Nausea and Vomiting    Penicillins Nausea and Vomiting      Family History   Problem Relation Age of Onset    Stroke Mother     Stroke Father       Social History     Social History    Marital status:      Spouse name: N/A    Number of children: N/A    Years of education: N/A     Occupational History    Not on file. Social History Main Topics    Smoking status: Never Smoker    Smokeless tobacco: Never Used    Alcohol use No    Drug use: No    Sexual activity: Not on file     Other Topics Concern    Not on file     Social History Narrative      Current Outpatient Prescriptions   Medication Sig    CARBOXYMETHYLCELLULOSE SODIUM (THERATEARS OP) Apply  to eye three (3) times daily.  ipratropium (ATROVENT) 0.03 % nasal spray 2 Sprays every twelve (12) hours.  amLODIPine (NORVASC) 5 mg tablet Take 1 Tab by mouth daily.  PSYLLIUM SEED, WITH DEXTROSE, (FIBER PO) Take  by mouth every fourty-eight (48) hours.  polyethylene glycol (MIRALAX) 17 gram packet Take 17 g by mouth daily.  calcium carbonate (TUMS) 200 mg calcium (500 mg) chew Take 1 Tab by mouth as needed.  acetaminophen (TYLENOL EXTRA STRENGTH) 500 mg tablet Take  by mouth every six (6) hours as needed for Pain.  Azelastine (ASTEPRO) 0.15 % (205.5 mcg) nasal spray two (2) times a day.  fluorometholone (FML) 0.1 % ophthalmic suspension Administer 1 Drop to both eyes daily.  diclofenac (VOLTAREN) 1 % gel Apply  to affected area four (4) times daily.  EPINEPHrine (EPIPEN) 0.3 mg/0.3 mL injection 0.3 mg by IntraMUSCular route once as needed.  OTHER AMITIZA one TABLET DAILY    multivitamin (ONE A DAY) tablet Take 1 Tab by mouth daily.  loratadine (CLARITIN) 10 mg tablet Take 10 mg by mouth daily.  Bifidobacterium Infantis (ALIGN) 4 mg cap Take  by mouth daily.  rivaroxaban (XARELTO) 15 mg tab tablet Take  by mouth daily.  atorvastatin (LIPITOR) 20 mg tablet Take 1 Tab by mouth daily.  omeprazole (PRILOSEC) 20 mg capsule Take 20 mg by mouth daily. Takes 40mg a day    melatonin 3 mg tablet Take  by mouth nightly. Takes for 3 weeks then stops a week before restarting    metoprolol succinate (TOPROL-XL) 50 mg XL tablet Take 50 mg by mouth two (2) times a day.     Cholecalciferol, Vitamin D3, (VITAMIN D3) 1,000 unit cap Take  by mouth daily. Taking 5000 units    METOPROLOL TARTRATE (LOPRESSOR PO) Take 25 mg by mouth as needed. Takes if blood pressure is in excess of 773 systolic    ALPRAZolam (XANAX) 0.25 mg tablet Take 0.5 mg by mouth nightly.  CA CARBONATE/VITAMIN D3/VIT K (VIACTIV PO) Take  by mouth nightly.  levothyroxine (SYNTHROID) 25 mcg tablet Take 50 mcg by mouth Daily (before breakfast). No current facility-administered medications for this visit. Review of Symptoms:    CONST  No weight change. No fever, chills, sweats    ENT No visual changes, URI sx, sore throat    CV  See HPI   RESP  No cough, or sputum, wheezing. Also see HPI   GI  No abdominal pain or change in bowel habits. No heartburn or dysphagia. No melena or rectal bleeding.   No dysuria, urgency, frequency, hematuria   MSKEL  No joint pain, swelling. No muscle pain. SKIN  No rash or lesions. NEURO  No headache, syncope, or seizure. No weakness, loss of sensation, or paresthesias. PSYCH  No low mood or depression  No anxiety. HE/LYMPH  No easy bruising, abnormal bleeding, or enlarged glands. Physical ExamPhysical Exam:    There were no vitals taken for this visit.   Gen: NAD  HEENT:  PERRL, throat clear  Neck: no adenopathy, no thyromegaly, no JVD   Heart:  irregular,Nl S1S2,  II/VI murmur, no gallop or rub.   Lungs:  clear  Abdomen:   Soft, non-tender, bowel sounds are active.   Extremities:  No edema  Pulse: symmetric  Neuro: A&O times 3, No focal neuro deficits    Cardiographics    ECG: afib, rate controlled, NSST, LVH, no acute changes    Labs:   Lab Results   Component Value Date/Time    Sodium 134 (L) 11/03/2017 12:15 AM    Potassium 3.7 11/03/2017 12:15 AM    Chloride 98 11/03/2017 12:15 AM    CO2 27 11/03/2017 12:15 AM    Anion gap 9 11/03/2017 12:15 AM    Glucose 87 11/03/2017 12:15 AM    BUN 16 11/03/2017 12:15 AM    Creatinine 0.74 11/03/2017 12:15 AM    BUN/Creatinine ratio 22 (H) 11/03/2017 12:15 AM    GFR est AA >60 11/03/2017 12:15 AM    GFR est non-AA >60 11/03/2017 12:15 AM    Calcium 9.5 11/03/2017 12:15 AM    Bilirubin, total 0.4 11/03/2017 12:15 AM    AST (SGOT) 33 11/03/2017 12:15 AM    Alk. phosphatase 116 11/03/2017 12:15 AM    Protein, total 8.4 (H) 11/03/2017 12:15 AM    Albumin 3.8 11/03/2017 12:15 AM    Globulin 4.6 (H) 11/03/2017 12:15 AM    A-G Ratio 0.8 (L) 11/03/2017 12:15 AM    ALT (SGPT) 44 11/03/2017 12:15 AM     Lab Results   Component Value Date/Time    CK 91 11/03/2017 12:15 AM     No results found for: CHOL, CHOLX, CHLST, CHOLV, 716695, HDL, LDL, LDLC, DLDLP, TGLX, TRIGL, TRIGP, CHHD, CHHDX  No results found for this or any previous visit. Assessment:         Patient Active Problem List    Diagnosis Date Noted    Essential hypertension 03/20/2018    Constipation     Snoring     Carotid bruit 07/26/2012    Atrial fibrillation (HCC)     Aortic insufficiency     Benign hypertensive heart disease without heart failure     Pure hypercholesterolemia     Mitral insufficiency     Hypothyroid       No CV sx or complaints.  Continues to see PCP.  Rate controlled AFib on anticoag.  Has had to take \"extra\" metoprolol tartrate on occasion for tachy. BP elevated, started on Amlodipine 2.5mg     Plan:     Doing well with no adverse cardiac symptoms. SBP elevated today, normally ok (now on amlodipine)--monitors  Echo/doppler  Continue current meds--rate controlled chronic afib on anticoag (metoprolol, xarelot)  Lipids and labs followed by PCP. Continue current care and f/u in 6 months.     Lukasz Bright MD

## 2019-03-11 RX ORDER — AMLODIPINE BESYLATE 5 MG/1
TABLET ORAL
Qty: 90 TAB | Refills: 2 | Status: SHIPPED | OUTPATIENT
Start: 2019-03-11 | End: 2020-08-11 | Stop reason: SDUPTHER

## 2019-07-14 PROBLEM — I63.9 STROKE (CEREBRUM) (HCC): Status: ACTIVE | Noted: 2019-07-14

## 2019-07-14 PROBLEM — N39.0 UTI (URINARY TRACT INFECTION): Status: ACTIVE | Noted: 2019-07-14

## 2019-07-14 PROBLEM — R42 DIZZY: Status: ACTIVE | Noted: 2019-07-14

## 2019-07-15 PROBLEM — I67.9 CVD (CEREBROVASCULAR DISEASE): Status: ACTIVE | Noted: 2019-07-14

## 2019-07-16 PROBLEM — R33.9 URINE RETENTION: Chronic | Status: ACTIVE | Noted: 2019-07-16

## 2019-10-01 ENCOUNTER — OFFICE VISIT (OUTPATIENT)
Dept: CARDIOLOGY CLINIC | Age: 84
End: 2019-10-01

## 2019-10-01 VITALS
BODY MASS INDEX: 17.36 KG/M2 | SYSTOLIC BLOOD PRESSURE: 142 MMHG | WEIGHT: 98 LBS | DIASTOLIC BLOOD PRESSURE: 80 MMHG | OXYGEN SATURATION: 99 % | RESPIRATION RATE: 14 BRPM | HEART RATE: 64 BPM | HEIGHT: 63 IN

## 2019-10-01 DIAGNOSIS — I35.1 NONRHEUMATIC AORTIC VALVE INSUFFICIENCY: ICD-10-CM

## 2019-10-01 DIAGNOSIS — I34.0 NONRHEUMATIC MITRAL VALVE REGURGITATION: ICD-10-CM

## 2019-10-01 DIAGNOSIS — I48.20 CHRONIC ATRIAL FIBRILLATION (HCC): Primary | ICD-10-CM

## 2019-10-01 DIAGNOSIS — I10 ESSENTIAL HYPERTENSION: ICD-10-CM

## 2019-10-01 DIAGNOSIS — E78.00 PURE HYPERCHOLESTEROLEMIA: ICD-10-CM

## 2019-10-01 DIAGNOSIS — E03.9 ACQUIRED HYPOTHYROIDISM: ICD-10-CM

## 2019-10-01 DIAGNOSIS — I67.9 CVD (CEREBROVASCULAR DISEASE): ICD-10-CM

## 2019-10-01 RX ORDER — METOPROLOL TARTRATE 25 MG/1
25 TABLET, FILM COATED ORAL AS NEEDED
Qty: 30 TAB | Refills: 2 | Status: SHIPPED | OUTPATIENT
Start: 2019-10-01 | End: 2019-10-01 | Stop reason: SDUPTHER

## 2019-10-01 RX ORDER — METOPROLOL SUCCINATE 50 MG/1
50 TABLET, EXTENDED RELEASE ORAL 2 TIMES DAILY
Qty: 180 TAB | Refills: 1 | Status: SHIPPED | OUTPATIENT
Start: 2019-10-01 | End: 2022-09-14

## 2019-10-01 NOTE — TELEPHONE ENCOUNTER
Verbal order per Dr. Chandler Crum metoprolol tartrate (LOPRESSOR) 25 mg tablet - TAKE ONE TABLET PRN FOR PALPS / D:30 R:2 Order (medication, dose, route, frequency, amount, refills) repeated and verified twice.

## 2019-10-01 NOTE — PROGRESS NOTES
Ese Miles is a 80 y.o. female is here for routine f/u. No CV sx or complaints.  Continues to see PCP.  Rate controlled AFib on anticoag .  Has had to take \"extra\" metoprolol tartrate on occasion for tachy. Hospitalized at [facility] in July with dizziness, cerebrovascular disease, UTI. Echo 7/15/19 with LVEF >70, severe LAE, mild AI, mild MR, normal RVSP. Carotid dopplers 7/15/19 with mild bilat carotid plaque. Head Ct/MRI with old chronic vasc changes/old infarct. The patient denies chest pain/ shortness of breath, orthopnea, PND, LE edema, palpitations, syncope, presyncope or fatigue. 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     Pure hypercholesterolemia     Mitral insufficiency     Hypothyroid       Carli Tierney MD (Inactive)  Past Medical History:   Diagnosis Date    Anxiety disorder     Aortic insufficiency     Atrial fibrillation (HCC)     Warfarin stopped by PCP for lung lesion    Benign hypertensive heart disease without heart failure     Carotid bruit 7/26/2012    Constipation     Essential hypertension     Heart disease     HTN (hypertension)     Hypothyroid     Memory disorder     Mesothelioma ([address]     Mitral insufficiency     Pure hypercholesterolemia     Snoring     Unspecified cerebral artery occlusion with cerebral infarction       Past Surgical History:   Procedure Laterality Date    ECHO 2D ADULT  2009    mild LV systolic dysfunction, normal chamber dimensions, mild aortic regurgitation and mild mitral regurgitation. The ejection fraction was 45-50%.     HX APPENDECTOMY      HX CATARACT REMOVAL  HX TONSILLECTOMY       Allergies   Allergen Reactions    Flagyl [Metronidazole] Nausea and Vomiting    Penicillins Nausea and Vomiting      Family History   Problem Relation Age of Onset    Stroke Mother     Stroke Father       Social History     Socioeconomic History    Marital status:      Spouse name: Not on file    Number of children: Not on file    Years of education: Not on file    Highest education level: Not on file   Occupational History    Not on file   Social Needs    Financial resource strain: Not on file    Food insecurity:     Worry: Not on file     Inability: Not on file    Transportation needs:     Medical: Not on file     Non-medical: Not on file   Tobacco Use    Smoking status: Never Smoker    Smokeless tobacco: Never Used   Substance and Sexual Activity    Alcohol use: No    Drug use: No    Sexual activity: Not on file   Lifestyle    Physical activity:     Days per week: Not on file     Minutes per session: Not on file    Stress: Not on file   Relationships    Social connections:     Talks on phone: Not on file     Gets together: Not on file     Attends Holiness service: Not on file     Active member of club or organization: Not on file     Attends meetings of clubs or organizations: Not on file     Relationship status: Not on file    Intimate partner violence:     Fear of current or ex partner: Not on file     Emotionally abused: Not on file     Physically abused: Not on file     Forced sexual activity: Not on file   Other Topics Concern    Not on file   Social History Narrative    Not on file      Current Outpatient Medications   Medication Sig    Bifidobacterium infantis (ALIGN PO) Take  by mouth.  cranberry fruit extract (CRANBERRY PO) Take  by mouth two (2) times a day.  metoprolol succinate (TOPROL-XL) 50 mg XL tablet Take 1 Tab by mouth two (2) times a day.  melatonin 3 mg tablet Take 1 Tab by mouth nightly.  Takes for 3 weeks then stops a week before restarting    amLODIPine (NORVASC) 5 mg tablet TAKE 1 TABLET DAILY    OTHER Fiber Well takes 2 gummies once a day    ipratropium (ATROVENT) 0.03 % nasal spray 2 Sprays daily. Indications: For Allergies    polyethylene glycol (MIRALAX) 17 gram packet Take 17 g by mouth as needed. Indications: Patient seldom takes it.  OTHER AMITIZA one TABLET DAILY    multivitamin (ONE A DAY) tablet Take 1 Tab by mouth daily.  loratadine (CLARITIN) 10 mg tablet Take 10 mg by mouth daily.  rivaroxaban (XARELTO) 15 mg tab tablet Take  by mouth daily (with dinner).  atorvastatin (LIPITOR) 20 mg tablet Take 1 Tab by mouth daily. (Patient taking differently: Take 20 mg by mouth nightly.)    omeprazole (PRILOSEC) 20 mg capsule Take 40 mg by mouth daily. Takes 40mg a day    Cholecalciferol, Vitamin D3, (VITAMIN D3) 1,000 unit cap Take  by mouth daily. Taking 5000 units    METOPROLOL TARTRATE (LOPRESSOR PO) Take 25 mg by mouth as needed. Takes if blood pressure is in excess of 773 systolic    ALPRAZolam (XANAX) 0.25 mg tablet Take 0.25 mg by mouth nightly.  CA CARBONATE/VITAMIN D3/VIT K (VIACTIV PO) Take  by mouth nightly.  levothyroxine (SYNTHROID) 25 mcg tablet Take 50 mcg by mouth Daily (before breakfast).  EPINEPHrine (EPIPEN) 0.3 mg/0.3 mL injection 0.3 mg by IntraMUSCular route once as needed. No current facility-administered medications for this visit. Review of Symptoms:    CONST  No weight change. No fever, chills, sweats    ENT No visual changes, URI sx, sore throat    CV  See HPI   RESP  No cough, or sputum, wheezing. Also see HPI   GI  No abdominal pain or change in bowel habits. No heartburn or dysphagia. No melena or rectal bleeding.   No dysuria, urgency, frequency, hematuria   MSKEL  No joint pain, swelling. No muscle pain. SKIN  No rash or lesions. NEURO  No headache, syncope, or seizure. No weakness, loss of sensation, or paresthesias.     PSYCH  No low mood or depression  No anxiety. HE/LYMPH  No easy bruising, abnormal bleeding, or enlarged glands.         Physical ExamPhysical Exam:    Visit Vitals  /80 (BP 1 Location: Left arm, BP Patient Position: Sitting)   Pulse 64   Resp 14   Ht 5' 3\" (1.6 m)   Wt 98 lb (44.5 kg)   SpO2 99% Comment: ra   BMI 17.36 kg/m²     Gen: NAD  HEENT:  PERRL, throat clear  Neck: no adenopathy, no thyromegaly, no JVD   Heart:  irregular,Nl S1S2,  II/VI murmur, no gallop or rub.   Lungs:  clear  Abdomen:   Soft, non-tender, bowel sounds are active.   Extremities:  No edema  Pulse: symmetric  Neuro: A&O times 3, No focal neuro deficits    Cardiographics    ECG: afib, LVH, NSST      Labs:   Lab Results   Component Value Date/Time    Sodium 139 07/16/2019 06:08 AM    Sodium 140 07/15/2019 05:40 AM    Sodium 137 07/14/2019 03:12 PM    Sodium 142 07/10/2019 04:15 PM    Sodium 134 (L) 11/03/2017 12:15 AM    Potassium 4.0 07/16/2019 06:08 AM    Potassium 4.0 07/15/2019 05:40 AM    Potassium 4.1 07/14/2019 03:12 PM    Potassium 3.7 07/10/2019 04:15 PM    Potassium 3.7 11/03/2017 12:15 AM    Chloride 101 07/16/2019 06:08 AM    Chloride 104 07/15/2019 05:40 AM    Chloride 101 07/14/2019 03:12 PM    Chloride 102 07/10/2019 04:15 PM    Chloride 98 11/03/2017 12:15 AM    CO2 31 07/16/2019 06:08 AM    CO2 27 07/15/2019 05:40 AM    CO2 28 07/14/2019 03:12 PM    CO2 30 07/10/2019 04:15 PM    CO2 27 11/03/2017 12:15 AM    Anion gap 7 07/16/2019 06:08 AM    Anion gap 9 07/15/2019 05:40 AM    Anion gap 8 07/14/2019 03:12 PM    Anion gap 10 07/10/2019 04:15 PM    Anion gap 9 11/03/2017 12:15 AM    Glucose 84 07/16/2019 06:08 AM    Glucose 85 07/15/2019 05:40 AM    Glucose 97 07/14/2019 03:12 PM    Glucose 90 07/10/2019 04:15 PM    Glucose 87 11/03/2017 12:15 AM    BUN 14 07/16/2019 06:08 AM    BUN 19 07/15/2019 05:40 AM    BUN 18 07/14/2019 03:12 PM    BUN 13 07/10/2019 04:15 PM    BUN 16 11/03/2017 12:15 AM    Creatinine 0.84 07/16/2019 06:08 AM Creatinine 0.80 07/15/2019 05:40 AM    Creatinine 0.77 07/14/2019 03:12 PM    Creatinine 0.75 07/10/2019 04:15 PM    Creatinine 0.74 11/03/2017 12:15 AM    BUN/Creatinine ratio 17 07/16/2019 06:08 AM    BUN/Creatinine ratio 24 (H) 07/15/2019 05:40 AM    BUN/Creatinine ratio 23 (H) 07/14/2019 03:12 PM    BUN/Creatinine ratio 17 07/10/2019 04:15 PM    BUN/Creatinine ratio 22 (H) 11/03/2017 12:15 AM    GFR est AA >60 07/16/2019 06:08 AM    GFR est AA >60 07/15/2019 05:40 AM    GFR est AA >60 07/14/2019 03:12 PM    GFR est AA >60 07/10/2019 04:15 PM    GFR est AA >60 11/03/2017 12:15 AM    GFR est non-AA >60 07/16/2019 06:08 AM    GFR est non-AA >60 07/15/2019 05:40 AM    GFR est non-AA >60 07/14/2019 03:12 PM    GFR est non-AA >60 07/10/2019 04:15 PM    GFR est non-AA >60 11/03/2017 12:15 AM    Calcium 9.2 07/16/2019 06:08 AM    Calcium 9.0 07/15/2019 05:40 AM    Calcium 8.7 07/14/2019 03:12 PM    Calcium 9.3 07/10/2019 04:15 PM    Calcium 9.5 11/03/2017 12:15 AM    Bilirubin, total 0.4 07/14/2019 03:12 PM    Bilirubin, total 0.8 07/10/2019 04:15 PM    Bilirubin, total 0.4 11/03/2017 12:15 AM    AST (SGOT) 17 07/14/2019 03:12 PM    AST (SGOT) 23 07/10/2019 04:15 PM    AST (SGOT) 33 11/03/2017 12:15 AM    Alk. phosphatase 85 07/14/2019 03:12 PM    Alk. phosphatase 97 07/10/2019 04:15 PM    Alk.  phosphatase 116 11/03/2017 12:15 AM    Protein, total 7.3 07/14/2019 03:12 PM    Protein, total 8.4 (H) 07/10/2019 04:15 PM    Protein, total 8.4 (H) 11/03/2017 12:15 AM    Albumin 3.5 07/14/2019 03:12 PM    Albumin 4.2 07/10/2019 04:15 PM    Albumin 3.8 11/03/2017 12:15 AM    Globulin 3.8 07/14/2019 03:12 PM    Globulin 4.2 (H) 07/10/2019 04:15 PM    Globulin 4.6 (H) 11/03/2017 12:15 AM    A-G Ratio 0.9 (L) 07/14/2019 03:12 PM    A-G Ratio 1.0 (L) 07/10/2019 04:15 PM    A-G Ratio 0.8 (L) 11/03/2017 12:15 AM    ALT (SGPT) 23 07/14/2019 03:12 PM    ALT (SGPT) 28 07/10/2019 04:15 PM    ALT (SGPT) 44 11/03/2017 12:15 AM     Lab Results   Component Value Date/Time    CK 55 07/14/2019 03:12 PM     Lab Results   Component Value Date/Time    Cholesterol, total 134 07/15/2019 05:40 AM    HDL Cholesterol 50 07/15/2019 05:40 AM    LDL, calculated 72.4 07/15/2019 05:40 AM    Triglyceride 58 07/15/2019 05:40 AM    CHOL/HDL Ratio 2.7 07/15/2019 05:40 AM     No results found for this or any previous visit. Assessment:         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     Pure hypercholesterolemia     Mitral insufficiency     Hypothyroid       No CV sx or complaints.  Continues to see PCP.  Rate controlled AFib on anticoag .  Has had to take \"extra\" metoprolol tartrate on occasion for tachy. Hospitalized at [facility] in July with dizziness, cerebrovascular disease, UTI. Echo 7/15/19 with LVEF >70, severe LAE, mild AI, mild MR, normal RVSP. Carotid dopplers 7/15/19 with mild bilat carotid plaque. Head Ct/MRI with old chronic vasc changes/old infarct. Plan:     Doing well with no adverse cardiac symptoms. Lipids and labs followed by PCP. Continue current care and f/u in 6 months.     Carlton Almazan MD

## 2019-10-01 NOTE — PROGRESS NOTES
Verified patient with two patient identifiers. Medications reviewed/approved by Dr. Karri Ortiz. A verbal order from Dr. Karri Ortiz was received with [facility] to remove any medications that were deleted during the visit. Medication(s) removed:  aspirin 81 mg chewable tablet   CARBOXYMETHYLCELLULOSE SODIUM (THERATEARS OP)   fluorometholone (FML) 0.1 % ophthalmic suspension       Chief Complaint   Patient presents with    Irregular Heart Beat     Annual follow up    Hypertension     1. Have you been to the ER, urgent care clinic since your last visit? Hospitalized since your last visit? yes, [facility] ER 7/2019 cystitis, 7/2019 [facility] admission for dizziness. 2. Have you seen or consulted any other health care providers outside of the [address] [place] [address] [facility] since your last visit? Include any pap smears or colon screening. Yes, pcp Dr. Jesus Alberto Luis last seen 8/30/19 for left wrist pain, injury of buttock.

## 2019-10-02 RX ORDER — METOPROLOL TARTRATE 25 MG/1
TABLET, FILM COATED ORAL
Qty: 90 TAB | Refills: 2 | Status: SHIPPED | OUTPATIENT
Start: 2019-10-02 | End: 2020-10-28

## 2019-11-26 DIAGNOSIS — I10 ESSENTIAL HYPERTENSION: Primary | ICD-10-CM

## 2019-11-26 RX ORDER — AMLODIPINE BESYLATE 5 MG/1
TABLET ORAL
Qty: 90 TAB | Refills: 4 | OUTPATIENT
Start: 2019-11-26

## 2019-11-26 RX ORDER — AMLODIPINE BESYLATE 5 MG/1
5 TABLET ORAL DAILY
Qty: 90 TAB | Refills: 2 | Status: SHIPPED | OUTPATIENT
Start: 2019-11-26 | End: 2020-08-24

## 2020-08-11 ENCOUNTER — OFFICE VISIT (OUTPATIENT)
Dept: CARDIOLOGY CLINIC | Age: 85
End: 2020-08-11

## 2020-08-11 VITALS
RESPIRATION RATE: 14 BRPM | TEMPERATURE: 97.1 F | OXYGEN SATURATION: 100 % | BODY MASS INDEX: 16.3 KG/M2 | HEART RATE: 58 BPM | WEIGHT: 92 LBS | DIASTOLIC BLOOD PRESSURE: 82 MMHG | HEIGHT: 63 IN | SYSTOLIC BLOOD PRESSURE: 140 MMHG

## 2020-08-11 DIAGNOSIS — I48.19 PERSISTENT ATRIAL FIBRILLATION (HCC): Primary | ICD-10-CM

## 2020-08-11 DIAGNOSIS — I67.9 CVD (CEREBROVASCULAR DISEASE): ICD-10-CM

## 2020-08-11 DIAGNOSIS — I34.0 MITRAL VALVE INSUFFICIENCY, UNSPECIFIED ETIOLOGY: ICD-10-CM

## 2020-08-11 DIAGNOSIS — E78.00 PURE HYPERCHOLESTEROLEMIA: ICD-10-CM

## 2020-08-11 DIAGNOSIS — I35.1 AORTIC VALVE INSUFFICIENCY, ETIOLOGY OF CARDIAC VALVE DISEASE UNSPECIFIED: ICD-10-CM

## 2020-08-11 DIAGNOSIS — I10 ESSENTIAL HYPERTENSION: ICD-10-CM

## 2020-08-11 NOTE — PROGRESS NOTES
Ajay Walker is a 80 y.o. female is here for routine f/u. No CV sx or complaints.  Continues to see PCP.  Persistent/chronic rate-controlled AFib on anticoag .  Has had to take \"extra\" metoprolol tartrate on occasion for tachy. Hospitalized [facility] in July 2019 with dizziness, cerebrovascular disease, UTI. Echo 7/15/19 with LVEF >70, severe LAE, mild AI, mild MR, normal RVSP. Carotid dopplers 7/15/19 with mild bilat carotid plaque. Head Ct/MRI with old chronic vasc changes/old infarct. The patient denies chest pain/ shortness of breath, orthopnea, PND, LE edema, palpitations, syncope, presyncope or fatigue. 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     Pure hypercholesterolemia     Mitral insufficiency     Hypothyroid       Kimberly Mcgarry MD (Inactive)  Past Medical History:   Diagnosis Date    Anxiety disorder     Aortic insufficiency     Atrial fibrillation (HCC)     Warfarin stopped by PCP for lung lesion    Benign hypertensive heart disease without heart failure     Carotid bruit 7/26/2012    Constipation     Essential hypertension     Heart disease     HTN (hypertension)     Hypothyroid     Memory disorder     Mesothelioma ([address]     Mitral insufficiency     Pure hypercholesterolemia     Snoring     Unspecified cerebral artery occlusion with cerebral infarction       Past Surgical History:   Procedure Laterality Date    ECHO 2D ADULT  2009    mild LV systolic dysfunction, normal chamber dimensions, mild aortic regurgitation and mild mitral regurgitation. The ejection fraction was 45-50%.     HX APPENDECTOMY  HX CATARACT REMOVAL      HX TONSILLECTOMY       Allergies   Allergen Reactions    Flagyl [Metronidazole] Nausea and Vomiting    Penicillins Nausea and Vomiting      Family History   Problem Relation Age of Onset    Stroke Mother     Stroke Father       Social History     Socioeconomic History    Marital status:      Spouse name: Not on file    Number of children: Not on file    Years of education: Not on file    Highest education level: Not on file   Occupational History    Not on file   Social Needs    Financial resource strain: Not on file    Food insecurity     Worry: Not on file     Inability: Not on file    Transportation needs     Medical: Not on file     Non-medical: Not on file   Tobacco Use    Smoking status: Never Smoker    Smokeless tobacco: Never Used   Substance and Sexual Activity    Alcohol use: No    Drug use: No    Sexual activity: Not on file   Lifestyle    Physical activity     Days per week: Not on file     Minutes per session: Not on file    Stress: Not on file   Relationships    Social connections     Talks on phone: Not on file     Gets together: Not on file     Attends Sikh service: Not on file     Active member of club or organization: Not on file     Attends meetings of clubs or organizations: Not on file     Relationship status: Not on file    Intimate partner violence     Fear of current or ex partner: Not on file     Emotionally abused: Not on file     Physically abused: Not on file     Forced sexual activity: Not on file   Other Topics Concern    Not on file   Social History Narrative    Not on file      Current Outpatient Medications   Medication Sig    OTHER IB Guard po.  amLODIPine (NORVASC) 5 mg tablet Take 1 Tab by mouth daily.  metoprolol tartrate (LOPRESSOR) 25 mg tablet TAKE 1 TABLET BY MOUTH AS NEEDED    Bifidobacterium infantis (ALIGN PO) Take  by mouth.     cranberry fruit extract (CRANBERRY PO) Take  by mouth two (2) times a day.    metoprolol succinate (TOPROL-XL) 50 mg XL tablet Take 1 Tab by mouth two (2) times a day. (Patient taking differently: Take 50 mg by mouth daily.)    melatonin 3 mg tablet Take 1 Tab by mouth nightly. Takes for 3 weeks then stops a week before restarting    OTHER Fiber Con po    ipratropium (ATROVENT) 0.03 % nasal spray 2 Sprays daily. Indications: For Allergies    polyethylene glycol (MIRALAX) 17 gram packet Take 17 g by mouth as needed. Indications: Patient seldom takes it.  OTHER AMITIZA one TABLET DAILY    multivitamin (ONE A DAY) tablet Take 1 Tab by mouth daily.  loratadine (CLARITIN) 10 mg tablet Take 10 mg by mouth daily.  rivaroxaban (XARELTO) 15 mg tab tablet Take  by mouth daily (with dinner).  atorvastatin (LIPITOR) 20 mg tablet Take 1 Tab by mouth daily. (Patient taking differently: Take 20 mg by mouth nightly.)    omeprazole (PRILOSEC) 20 mg capsule Take 40 mg by mouth daily. Takes 40mg a day    Cholecalciferol, Vitamin D3, (VITAMIN D3) 1,000 unit cap Take  by mouth daily. Taking 5000 units    ALPRAZolam (XANAX) 0.25 mg tablet Take 0.25 mg by mouth nightly.  CA CARBONATE/VITAMIN D3/VIT K (VIACTIV PO) Take  by mouth nightly.  levothyroxine (SYNTHROID) 25 mcg tablet Take 50 mcg by mouth Daily (before breakfast).  dicyclomine (BentyL) 10 mg capsule Take 1 Cap by mouth three (3) times daily.  EPINEPHrine (EPIPEN) 0.3 mg/0.3 mL injection 0.3 mg by IntraMUSCular route once as needed. No current facility-administered medications for this visit. Review of Symptoms:    CONST  No weight change. No fever, chills, sweats    ENT No visual changes, URI sx, sore throat    CV  See HPI   RESP  No cough, or sputum, wheezing. Also see HPI   GI  No abdominal pain or change in bowel habits. No heartburn or dysphagia. No melena or rectal bleeding.   No dysuria, urgency, frequency, hematuria   MSKEL  No joint pain, swelling. No muscle pain.     SKIN  No rash or lesions. NEURO  No headache, syncope, or seizure. No weakness, loss of sensation, or paresthesias. PSYCH  No low mood or depression  No anxiety. HE/LYMPH  No easy bruising, abnormal bleeding, or enlarged glands. Physical ExamPhysical Exam:    Visit Vitals  /82 (BP 1 Location: Right arm, BP Patient Position: Sitting)   Pulse (!) 58   Temp 97.1 °F (36.2 °C) (Temporal)   Resp 14   Ht 5' 3\" (1.6 m)   Wt 92 lb (41.7 kg)   SpO2 100% Comment: ra   BMI 16.30 kg/m²     Gen: NAD  HEENT:  PERRL, throat clear  Neck: no adenopathy, no thyromegaly, no JVD   Heart:  irregular,Nl S1S2,  II/VI  murmur, no gallop or rub. Lungs:  clear  Abdomen:   Soft, non-tender, bowel sounds are active.    Extremities:  No edema  Pulse: symmetric  Neuro: A&O times 3, No focal neuro deficits    Cardiographics    ECG: afib/flutter HR 58, no acute changes      Labs:   Lab Results   Component Value Date/Time    Sodium 136 01/11/2020 05:25 PM    Sodium 139 07/16/2019 06:08 AM    Sodium 140 07/15/2019 05:40 AM    Sodium 137 07/14/2019 03:12 PM    Sodium 142 07/10/2019 04:15 PM    Potassium 4.1 01/11/2020 05:25 PM    Potassium 4.0 07/16/2019 06:08 AM    Potassium 4.0 07/15/2019 05:40 AM    Potassium 4.1 07/14/2019 03:12 PM    Potassium 3.7 07/10/2019 04:15 PM    Chloride 99 01/11/2020 05:25 PM    Chloride 101 07/16/2019 06:08 AM    Chloride 104 07/15/2019 05:40 AM    Chloride 101 07/14/2019 03:12 PM    Chloride 102 07/10/2019 04:15 PM    CO2 27 01/11/2020 05:25 PM    CO2 31 07/16/2019 06:08 AM    CO2 27 07/15/2019 05:40 AM    CO2 28 07/14/2019 03:12 PM    CO2 30 07/10/2019 04:15 PM    Anion gap 10 01/11/2020 05:25 PM    Anion gap 7 07/16/2019 06:08 AM    Anion gap 9 07/15/2019 05:40 AM    Anion gap 8 07/14/2019 03:12 PM    Anion gap 10 07/10/2019 04:15 PM    Glucose 95 01/11/2020 05:25 PM    Glucose 84 07/16/2019 06:08 AM    Glucose 85 07/15/2019 05:40 AM    Glucose 97 07/14/2019 03:12 PM    Glucose 90 07/10/2019 04:15 PM    BUN 20 01/11/2020 05:25 PM    BUN 14 07/16/2019 06:08 AM    BUN 19 07/15/2019 05:40 AM    BUN 18 07/14/2019 03:12 PM    BUN 13 07/10/2019 04:15 PM    Creatinine 0.82 01/11/2020 05:25 PM    Creatinine 0.84 07/16/2019 06:08 AM    Creatinine 0.80 07/15/2019 05:40 AM    Creatinine 0.77 07/14/2019 03:12 PM    Creatinine 0.75 07/10/2019 04:15 PM    BUN/Creatinine ratio 24 (H) 01/11/2020 05:25 PM    BUN/Creatinine ratio 17 07/16/2019 06:08 AM    BUN/Creatinine ratio 24 (H) 07/15/2019 05:40 AM    BUN/Creatinine ratio 23 (H) 07/14/2019 03:12 PM    BUN/Creatinine ratio 17 07/10/2019 04:15 PM    GFR est AA >60 01/11/2020 05:25 PM    GFR est AA >60 07/16/2019 06:08 AM    GFR est AA >60 07/15/2019 05:40 AM    GFR est AA >60 07/14/2019 03:12 PM    GFR est AA >60 07/10/2019 04:15 PM    GFR est non-AA >60 01/11/2020 05:25 PM    GFR est non-AA >60 07/16/2019 06:08 AM    GFR est non-AA >60 07/15/2019 05:40 AM    GFR est non-AA >60 07/14/2019 03:12 PM    GFR est non-AA >60 07/10/2019 04:15 PM    Calcium 9.3 01/11/2020 05:25 PM    Calcium 9.2 07/16/2019 06:08 AM    Calcium 9.0 07/15/2019 05:40 AM    Calcium 8.7 07/14/2019 03:12 PM    Calcium 9.3 07/10/2019 04:15 PM    Bilirubin, total 0.6 01/11/2020 05:25 PM    Bilirubin, total 0.4 07/14/2019 03:12 PM    Bilirubin, total 0.8 07/10/2019 04:15 PM    Bilirubin, total 0.4 11/03/2017 12:15 AM    Alk. phosphatase 93 01/11/2020 05:25 PM    Alk. phosphatase 85 07/14/2019 03:12 PM    Alk. phosphatase 97 07/10/2019 04:15 PM    Alk.  phosphatase 116 11/03/2017 12:15 AM    Protein, total 8.0 01/11/2020 05:25 PM    Protein, total 7.3 07/14/2019 03:12 PM    Protein, total 8.4 (H) 07/10/2019 04:15 PM    Protein, total 8.4 (H) 11/03/2017 12:15 AM    Albumin 4.0 01/11/2020 05:25 PM    Albumin 3.5 07/14/2019 03:12 PM    Albumin 4.2 07/10/2019 04:15 PM    Albumin 3.8 11/03/2017 12:15 AM    Globulin 4.0 01/11/2020 05:25 PM    Globulin 3.8 07/14/2019 03:12 PM    Globulin 4.2 (H) 07/10/2019 04:15 PM    Globulin 4.6 (H) 11/03/2017 12:15 AM    A-G Ratio 1.0 (L) 01/11/2020 05:25 PM    A-G Ratio 0.9 (L) 07/14/2019 03:12 PM    A-G Ratio 1.0 (L) 07/10/2019 04:15 PM    A-G Ratio 0.8 (L) 11/03/2017 12:15 AM    ALT (SGPT) 25 01/11/2020 05:25 PM    ALT (SGPT) 23 07/14/2019 03:12 PM    ALT (SGPT) 28 07/10/2019 04:15 PM    ALT (SGPT) 44 11/03/2017 12:15 AM     Lab Results   Component Value Date/Time    CK 55 07/14/2019 03:12 PM     Lab Results   Component Value Date/Time    Cholesterol, total 134 07/15/2019 05:40 AM    HDL Cholesterol 50 07/15/2019 05:40 AM    LDL, calculated 72.4 07/15/2019 05:40 AM    Triglyceride 58 07/15/2019 05:40 AM    CHOL/HDL Ratio 2.7 07/15/2019 05:40 AM     No results found for this or any previous visit. Assessment:         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     Pure hypercholesterolemia     Mitral insufficiency     Hypothyroid         Plan:     Doing well with no adverse cardiac symptoms. Lipids and labs followed by PCP. Continue current care and f/u in 6 months.     Niesha Bazan MD

## 2020-08-11 NOTE — PROGRESS NOTES
Verified patient with two patient identifiers. Medications reviewed/approved by Dr. Bryant Salvador. A verbal order from Dr. Bryant Salvador was received with [facility] to remove any medications that were deleted during the visit. Medication(s) removed:  amLODIPine (NORVASC) 5 mg tablet      1. Have you been to the ER, urgent care clinic since your last visit? Hospitalized since your last visit? yes,    6/28/20 [facility] ER for abd cramping. 1/11/20 [facility] ER for L/H.  2. Have you seen or consulted any other health care providers outside of the [address] [place] [address] since your last visit? Include any pap smears or colon screening. YES, Dr. Cassandra Keenan pcp last seen 7/28/20 for uti.

## 2020-08-24 RX ORDER — AMLODIPINE BESYLATE 5 MG/1
TABLET ORAL
Qty: 90 TAB | Refills: 3 | Status: SHIPPED | OUTPATIENT
Start: 2020-08-24 | End: 2021-08-17

## 2021-02-16 ENCOUNTER — OFFICE VISIT (OUTPATIENT)
Dept: CARDIOLOGY CLINIC | Age: 86
End: 2021-02-16
Payer: MEDICARE

## 2021-02-16 VITALS
WEIGHT: 95 LBS | SYSTOLIC BLOOD PRESSURE: 118 MMHG | RESPIRATION RATE: 16 BRPM | OXYGEN SATURATION: 98 % | BODY MASS INDEX: 16.83 KG/M2 | HEIGHT: 63 IN | TEMPERATURE: 97.6 F | HEART RATE: 56 BPM | DIASTOLIC BLOOD PRESSURE: 70 MMHG

## 2021-02-16 DIAGNOSIS — I10 ESSENTIAL HYPERTENSION: ICD-10-CM

## 2021-02-16 DIAGNOSIS — E78.00 PURE HYPERCHOLESTEROLEMIA: ICD-10-CM

## 2021-02-16 DIAGNOSIS — I67.9 CVD (CEREBROVASCULAR DISEASE): ICD-10-CM

## 2021-02-16 DIAGNOSIS — I48.19 PERSISTENT ATRIAL FIBRILLATION (HCC): Primary | ICD-10-CM

## 2021-02-16 PROCEDURE — 1101F PT FALLS ASSESS-DOCD LE1/YR: CPT | Performed by: INTERNAL MEDICINE

## 2021-02-16 PROCEDURE — G8536 NO DOC ELDER MAL SCRN: HCPCS | Performed by: INTERNAL MEDICINE

## 2021-02-16 PROCEDURE — 1090F PRES/ABSN URINE INCON ASSESS: CPT | Performed by: INTERNAL MEDICINE

## 2021-02-16 PROCEDURE — G8427 DOCREV CUR MEDS BY ELIG CLIN: HCPCS | Performed by: INTERNAL MEDICINE

## 2021-02-16 PROCEDURE — 99214 OFFICE O/P EST MOD 30 MIN: CPT | Performed by: INTERNAL MEDICINE

## 2021-02-16 PROCEDURE — G8419 CALC BMI OUT NRM PARAM NOF/U: HCPCS | Performed by: INTERNAL MEDICINE

## 2021-02-16 PROCEDURE — G8510 SCR DEP NEG, NO PLAN REQD: HCPCS | Performed by: INTERNAL MEDICINE

## 2021-02-16 PROCEDURE — 93000 ELECTROCARDIOGRAM COMPLETE: CPT | Performed by: INTERNAL MEDICINE

## 2021-02-16 NOTE — PROGRESS NOTES
Chief Complaint   Patient presents with    Irregular Heart Beat     6 mth follow up     Hypertension     Verified patient with two patient identifiers. Medications reviewed/approved by Dr. Josephine Tay. 1. Have you been to the ER, urgent care clinic since your last visit? Hospitalized since your last visit?no    2. Have you seen or consulted any other health care providers outside of the [address] [place] [address] since your last visit? Include any pap smears or colon screening.  no

## 2021-02-16 NOTE — PROGRESS NOTES
Jelena Pleitez is a 80 y.o. female is here for routine f/u. No CV sx or complaints.  Continues to see PCP.  Persistent/chronic rate-controlled AFib on anticoag .  Has had to take \"extra\" metoprolol tartrate on occasion for tachy. Hospitalized at [facility] in July 2019 with dizziness, cerebrovascular disease, UTI.  Echo 7/15/19 with LVEF >70, severe LAE, mild AI, mild MR, normal RVSP.  Carotid dopplers 7/15/19 with mild bilat carotid plaque. Head Ct/MRI with old chronic vasc changes/old infarct. The patient denies chest pain/ shortness of breath, orthopnea, PND, LE edema, palpitations, syncope, presyncope or fatigue. 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     Pure hypercholesterolemia     Mitral insufficiency     Hypothyroid       Shawn Rosen MD (Inactive)  Past Medical History:   Diagnosis Date    Anxiety disorder     Aortic insufficiency     Atrial fibrillation (HCC)     Warfarin stopped by PCP for lung lesion    Benign hypertensive heart disease without heart failure     Carotid bruit 7/26/2012    Constipation     Essential hypertension     Heart disease     HTN (hypertension)     Hypothyroid     Memory disorder     Mesothelioma ([address]     Mitral insufficiency     Pure hypercholesterolemia     Snoring     Unspecified cerebral artery occlusion with cerebral infarction       Past Surgical History:   Procedure Laterality Date    ECHO 2D ADULT  2009    mild LV systolic dysfunction, normal chamber dimensions, mild aortic regurgitation and mild mitral regurgitation. The ejection fraction was 45-50%.     HX APPENDECTOMY  HX CATARACT REMOVAL      HX TONSILLECTOMY       Allergies   Allergen Reactions    Flagyl [Metronidazole] Nausea and Vomiting    Penicillins Nausea and Vomiting      Family History   Problem Relation Age of Onset    Stroke Mother     Stroke Father       Social History     Socioeconomic History    Marital status:      Spouse name: Not on file    Number of children: Not on file    Years of education: Not on file    Highest education level: Not on file   Occupational History    Not on file   Social Needs    Financial resource strain: Not on file    Food insecurity     Worry: Not on file     Inability: Not on file    Transportation needs     Medical: Not on file     Non-medical: Not on file   Tobacco Use    Smoking status: Never Smoker    Smokeless tobacco: Never Used   Substance and Sexual Activity    Alcohol use: No    Drug use: No    Sexual activity: Not on file   Lifestyle    Physical activity     Days per week: Not on file     Minutes per session: Not on file    Stress: Not on file   Relationships    Social connections     Talks on phone: Not on file     Gets together: Not on file     Attends Muslim service: Not on file     Active member of club or organization: Not on file     Attends meetings of clubs or organizations: Not on file     Relationship status: Not on file    Intimate partner violence     Fear of current or ex partner: Not on file     Emotionally abused: Not on file     Physically abused: Not on file     Forced sexual activity: Not on file   Other Topics Concern    Not on file   Social History Narrative    Not on file      Current Outpatient Medications   Medication Sig    metoprolol tartrate (LOPRESSOR) 25 mg tablet TAKE 1 TABLET BY MOUTH AS NEEDED    amLODIPine (NORVASC) 5 mg tablet TAKE 1 TABLET DAILY    OTHER IB Guard po.  Bifidobacterium infantis (ALIGN PO) Take  by mouth.  cranberry fruit extract (CRANBERRY PO) Take  by mouth two (2) times a day.     metoprolol succinate (TOPROL-XL) 50 mg XL tablet Take 1 Tab by mouth two (2) times a day. (Patient taking differently: Take 50 mg by mouth daily.)    melatonin 3 mg tablet Take 1 Tab by mouth nightly. Takes for 3 weeks then stops a week before restarting    OTHER Fiber Con po    polyethylene glycol (MIRALAX) 17 gram packet Take 17 g by mouth as needed. Indications: Patient seldom takes it.  EPINEPHrine (EPIPEN) 0.3 mg/0.3 mL injection 0.3 mg by IntraMUSCular route once as needed.  OTHER AMITIZA one TABLET DAILY    multivitamin (ONE A DAY) tablet Take 1 Tab by mouth daily.  loratadine (CLARITIN) 10 mg tablet Take 10 mg by mouth daily.  rivaroxaban (XARELTO) 15 mg tab tablet Take  by mouth daily (with dinner).  atorvastatin (LIPITOR) 20 mg tablet Take 1 Tab by mouth daily. (Patient taking differently: Take 20 mg by mouth nightly.)    omeprazole (PRILOSEC) 20 mg capsule Take 40 mg by mouth daily. Takes 40mg a day    Cholecalciferol, Vitamin D3, (VITAMIN D3) 1,000 unit cap Take  by mouth daily. Taking 5000 units    ALPRAZolam (XANAX) 0.25 mg tablet Take 0.25 mg by mouth nightly.  CA CARBONATE/VITAMIN D3/VIT K (VIACTIV PO) Take  by mouth nightly.  levothyroxine (SYNTHROID) 25 mcg tablet Take 50 mcg by mouth Daily (before breakfast).  dicyclomine (BentyL) 10 mg capsule Take 1 Cap by mouth three (3) times daily.  ipratropium (ATROVENT) 0.03 % nasal spray 2 Sprays daily. Indications: For Allergies     No current facility-administered medications for this visit. Review of Symptoms:    CONST  No weight change. No fever, chills, sweats    ENT No visual changes, URI sx, sore throat    CV  See HPI   RESP  No cough, or sputum, wheezing. Also see HPI   GI  No abdominal pain or change in bowel habits. No heartburn or dysphagia. No melena or rectal bleeding.   No dysuria, urgency, frequency, hematuria   MSKEL  No joint pain, swelling. No muscle pain. SKIN  No rash or lesions. NEURO  No headache, syncope, or seizure. No weakness, loss of sensation, or paresthesias. PSYCH  No low mood or depression  No anxiety. HE/LYMPH  No easy bruising, abnormal bleeding, or enlarged glands. Physical ExamPhysical Exam:    Visit Vitals  /70 (BP 1 Location: Right arm, BP Patient Position: Sitting, BP Cuff Size: Adult)   Pulse (!) 56   Temp 97.6 °F (36.4 °C) (Temporal)   Resp 16   Ht 5' 3\" (1.6 m)   Wt 95 lb (43.1 kg)   SpO2 98%   BMI 16.83 kg/m²     Gen: NAD  HEENT:  PERRL, throat clear  Neck: no adenopathy, no thyromegaly, no JVD   Heart:  irrrgular,Nl S1S2,  no murmur, gallop or rub. Lungs:  clear  Abdomen:   Soft, non-tender, bowel sounds are active.    Extremities:  No edema  Pulse: symmetric  Neuro: A&O times 3, No focal neuro deficits    Cardiographics    ECG: afib HR 53, NSST    Labs:   Lab Results   Component Value Date/Time    Sodium 136 01/11/2020 05:25 PM    Sodium 139 07/16/2019 06:08 AM    Sodium 140 07/15/2019 05:40 AM    Sodium 137 07/14/2019 03:12 PM    Sodium 142 07/10/2019 04:15 PM    Potassium 4.1 01/11/2020 05:25 PM    Potassium 4.0 07/16/2019 06:08 AM    Potassium 4.0 07/15/2019 05:40 AM    Potassium 4.1 07/14/2019 03:12 PM    Potassium 3.7 07/10/2019 04:15 PM    Chloride 99 01/11/2020 05:25 PM    Chloride 101 07/16/2019 06:08 AM    Chloride 104 07/15/2019 05:40 AM    Chloride 101 07/14/2019 03:12 PM    Chloride 102 07/10/2019 04:15 PM    CO2 27 01/11/2020 05:25 PM    CO2 31 07/16/2019 06:08 AM    CO2 27 07/15/2019 05:40 AM    CO2 28 07/14/2019 03:12 PM    CO2 30 07/10/2019 04:15 PM    Anion gap 10 01/11/2020 05:25 PM    Anion gap 7 07/16/2019 06:08 AM    Anion gap 9 07/15/2019 05:40 AM    Anion gap 8 07/14/2019 03:12 PM    Anion gap 10 07/10/2019 04:15 PM    Glucose 95 01/11/2020 05:25 PM    Glucose 84 07/16/2019 06:08 AM    Glucose 85 07/15/2019 05:40 AM    Glucose 97 07/14/2019 03:12 PM    Glucose 90 07/10/2019 04:15 PM    BUN 20 01/11/2020 05:25 PM    BUN 14 07/16/2019 06:08 AM    BUN 19 07/15/2019 05:40 AM    BUN 18 07/14/2019 03:12 PM    BUN 13 07/10/2019 04:15 PM    Creatinine 0.82 01/11/2020 05:25 PM    Creatinine 0.84 07/16/2019 06:08 AM    Creatinine 0.80 07/15/2019 05:40 AM    Creatinine 0.77 07/14/2019 03:12 PM    Creatinine 0.75 07/10/2019 04:15 PM    BUN/Creatinine ratio 24 (H) 01/11/2020 05:25 PM    BUN/Creatinine ratio 17 07/16/2019 06:08 AM    BUN/Creatinine ratio 24 (H) 07/15/2019 05:40 AM    BUN/Creatinine ratio 23 (H) 07/14/2019 03:12 PM    BUN/Creatinine ratio 17 07/10/2019 04:15 PM    GFR est AA >60 01/11/2020 05:25 PM    GFR est AA >60 07/16/2019 06:08 AM    GFR est AA >60 07/15/2019 05:40 AM    GFR est AA >60 07/14/2019 03:12 PM    GFR est AA >60 07/10/2019 04:15 PM    GFR est non-AA >60 01/11/2020 05:25 PM    GFR est non-AA >60 07/16/2019 06:08 AM    GFR est non-AA >60 07/15/2019 05:40 AM    GFR est non-AA >60 07/14/2019 03:12 PM    GFR est non-AA >60 07/10/2019 04:15 PM    Calcium 9.3 01/11/2020 05:25 PM    Calcium 9.2 07/16/2019 06:08 AM    Calcium 9.0 07/15/2019 05:40 AM    Calcium 8.7 07/14/2019 03:12 PM    Calcium 9.3 07/10/2019 04:15 PM    Bilirubin, total 0.6 01/11/2020 05:25 PM    Bilirubin, total 0.4 07/14/2019 03:12 PM    Bilirubin, total 0.8 07/10/2019 04:15 PM    Bilirubin, total 0.4 11/03/2017 12:15 AM    Alk. phosphatase 93 01/11/2020 05:25 PM    Alk. phosphatase 85 07/14/2019 03:12 PM    Alk. phosphatase 97 07/10/2019 04:15 PM    Alk.  phosphatase 116 11/03/2017 12:15 AM    Protein, total 8.0 01/11/2020 05:25 PM    Protein, total 7.3 07/14/2019 03:12 PM    Protein, total 8.4 (H) 07/10/2019 04:15 PM    Protein, total 8.4 (H) 11/03/2017 12:15 AM    Albumin 4.0 01/11/2020 05:25 PM    Albumin 3.5 07/14/2019 03:12 PM    Albumin 4.2 07/10/2019 04:15 PM    Albumin 3.8 11/03/2017 12:15 AM    Globulin 4.0 01/11/2020 05:25 PM    Globulin 3.8 07/14/2019 03:12 PM    Globulin 4.2 (H) 07/10/2019 04:15 PM    Globulin 4.6 (H) 11/03/2017 12:15 AM A-G Ratio 1.0 (L) 01/11/2020 05:25 PM    A-G Ratio 0.9 (L) 07/14/2019 03:12 PM    A-G Ratio 1.0 (L) 07/10/2019 04:15 PM    A-G Ratio 0.8 (L) 11/03/2017 12:15 AM    ALT (SGPT) 25 01/11/2020 05:25 PM    ALT (SGPT) 23 07/14/2019 03:12 PM    ALT (SGPT) 28 07/10/2019 04:15 PM    ALT (SGPT) 44 11/03/2017 12:15 AM     Lab Results   Component Value Date/Time    CK 55 07/14/2019 03:12 PM     Lab Results   Component Value Date/Time    Cholesterol, total 134 07/15/2019 05:40 AM    HDL Cholesterol 50 07/15/2019 05:40 AM    LDL, calculated 72.4 07/15/2019 05:40 AM    Triglyceride 58 07/15/2019 05:40 AM    CHOL/HDL Ratio 2.7 07/15/2019 05:40 AM     No results found for this or any previous visit. Assessment:         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     Pure hypercholesterolemia     Mitral insufficiency     Hypothyroid      No CV sx or complaints.  Continues to see PCP.  Persistent/chronic rate-controlled AFib on anticoag .  Has had to take \"extra\" metoprolol tartrate on occasion for tachy. Hospitalized at [facility] in July 2019 with dizziness, cerebrovascular disease, UTI.  Echo 7/15/19 with LVEF >70, severe LAE, mild AI, mild MR, normal RVSP.  Carotid dopplers 7/15/19 with mild bilat carotid plaque. Head Ct/MRI with old chronic vasc changes/old infarct. Plan:     Doing well with no adverse cardiac symptoms. Lipids and labs followed by PCP. Continue current care and f/u in 6 months.     Darian Eubanks MD

## 2021-02-16 NOTE — LETTER
2/16/2021 Patient: Jasmin Lopez YOB: 1932 Date of Visit: 2/16/2021 Nancy Hagen MD 
[address] [address] Via Fax: [phone] Dear Nancy Hagen MD, Thank you for referring Ms. Rossy Navarro to [facility] for evaluation. My notes for this consultation are attached. If you have questions, please do not hesitate to call me. I look forward to following your patient along with you.  
 
 
Sincerely, 
 
Maria Elena Hayes MD

## 2021-08-17 RX ORDER — AMLODIPINE BESYLATE 5 MG/1
TABLET ORAL
Qty: 90 TABLET | Refills: 3 | Status: SHIPPED | OUTPATIENT
Start: 2021-08-17 | End: 2022-07-20

## 2021-09-03 ENCOUNTER — OFFICE VISIT (OUTPATIENT)
Dept: CARDIOLOGY CLINIC | Age: 86
End: 2021-09-03
Payer: MEDICARE

## 2021-09-03 VITALS
SYSTOLIC BLOOD PRESSURE: 138 MMHG | WEIGHT: 94 LBS | OXYGEN SATURATION: 99 % | RESPIRATION RATE: 14 BRPM | BODY MASS INDEX: 16.66 KG/M2 | HEIGHT: 63 IN | DIASTOLIC BLOOD PRESSURE: 80 MMHG | TEMPERATURE: 97 F | HEART RATE: 45 BPM

## 2021-09-03 DIAGNOSIS — E78.00 PURE HYPERCHOLESTEROLEMIA: ICD-10-CM

## 2021-09-03 DIAGNOSIS — I10 ESSENTIAL HYPERTENSION: ICD-10-CM

## 2021-09-03 DIAGNOSIS — I48.19 PERSISTENT ATRIAL FIBRILLATION (HCC): Primary | ICD-10-CM

## 2021-09-03 DIAGNOSIS — I35.1 AORTIC VALVE INSUFFICIENCY, ETIOLOGY OF CARDIAC VALVE DISEASE UNSPECIFIED: ICD-10-CM

## 2021-09-03 DIAGNOSIS — I34.0 MITRAL VALVE INSUFFICIENCY, UNSPECIFIED ETIOLOGY: ICD-10-CM

## 2021-09-03 DIAGNOSIS — I67.9 CVD (CEREBROVASCULAR DISEASE): ICD-10-CM

## 2021-09-03 DIAGNOSIS — E03.9 ACQUIRED HYPOTHYROIDISM: ICD-10-CM

## 2021-09-03 PROCEDURE — 93000 ELECTROCARDIOGRAM COMPLETE: CPT | Performed by: INTERNAL MEDICINE

## 2021-09-03 PROCEDURE — G8427 DOCREV CUR MEDS BY ELIG CLIN: HCPCS | Performed by: INTERNAL MEDICINE

## 2021-09-03 PROCEDURE — 1090F PRES/ABSN URINE INCON ASSESS: CPT | Performed by: INTERNAL MEDICINE

## 2021-09-03 PROCEDURE — G8536 NO DOC ELDER MAL SCRN: HCPCS | Performed by: INTERNAL MEDICINE

## 2021-09-03 PROCEDURE — G8510 SCR DEP NEG, NO PLAN REQD: HCPCS | Performed by: INTERNAL MEDICINE

## 2021-09-03 PROCEDURE — 1101F PT FALLS ASSESS-DOCD LE1/YR: CPT | Performed by: INTERNAL MEDICINE

## 2021-09-03 PROCEDURE — 99214 OFFICE O/P EST MOD 30 MIN: CPT | Performed by: INTERNAL MEDICINE

## 2021-09-03 PROCEDURE — G8419 CALC BMI OUT NRM PARAM NOF/U: HCPCS | Performed by: INTERNAL MEDICINE

## 2021-09-03 NOTE — PROGRESS NOTES
Identified pt with two pt identifiers(name and ). Reviewed record in preparation for visit and have obtained necessary documentation. Chief Complaint   Patient presents with    Irregular Heart Beat     6 month follow up    Hypertension      Vitals:    21 1023   BP: 138/80   Pulse: (!) 45   Resp: 14   Temp: 97 °F (36.1 °C)   TempSrc: Temporal   SpO2: 99%   Weight: 94 lb (42.6 kg)   Height: 5' 3\" (1.6 m)   PainSc:   0 - No pain       Medications reviewed/approved by Dr. Juju Valentine. Health Maintenance Review: Patient reminded of \"due or due soon\" health maintenance. I have asked the patient to contact his/her primary care provider (PCP) for follow-up on his/her health maintenance. Coordination of Care Questionnaire:  :   1) Have you been to an emergency room, urgent care, or hospitalized since your last visit? If yes, where when, and reason for visit? no       2. Have seen or consulted any other health care provider since your last visit? If yes, where when, and reason for visit? YES - Dr. Saint Sports pcp. Patient is accompanied by self I have received verbal consent from Naty Bajwa to discuss any/all medical information while they are present in the room.

## 2021-09-11 ENCOUNTER — HOSPITAL ENCOUNTER (EMERGENCY)
Age: 86
Discharge: HOME | End: 2021-09-12
Attending: FAMILY MEDICINE
Payer: MEDICARE

## 2021-09-11 DIAGNOSIS — H81.11 BENIGN PAROXYSMAL POSITIONAL VERTIGO OF RIGHT EAR: Primary | ICD-10-CM

## 2021-09-11 PROCEDURE — 99284 EMERGENCY DEPT VISIT MOD MDM: CPT

## 2021-09-11 PROCEDURE — 74011250637 HC RX REV CODE- 250/637: Performed by: FAMILY MEDICINE

## 2021-09-11 PROCEDURE — 93005 ELECTROCARDIOGRAM TRACING: CPT

## 2021-09-11 PROCEDURE — 74011250636 HC RX REV CODE- 250/636: Performed by: FAMILY MEDICINE

## 2021-09-11 RX ORDER — ONDANSETRON 4 MG/1
4 TABLET, ORALLY DISINTEGRATING ORAL
Status: COMPLETED | OUTPATIENT
Start: 2021-09-11 | End: 2021-09-11

## 2021-09-11 RX ORDER — MECLIZINE HYDROCHLORIDE 25 MG/1
25 TABLET ORAL
Status: COMPLETED | OUTPATIENT
Start: 2021-09-11 | End: 2021-09-11

## 2021-09-11 RX ADMIN — MECLIZINE HYDROCHLORIDE 25 MG: 25 TABLET ORAL at 23:36

## 2021-09-11 RX ADMIN — ONDANSETRON 4 MG: 4 TABLET, ORALLY DISINTEGRATING ORAL at 23:37

## 2021-09-12 VITALS
DIASTOLIC BLOOD PRESSURE: 69 MMHG | OXYGEN SATURATION: 97 % | RESPIRATION RATE: 16 BRPM | HEART RATE: 64 BPM | TEMPERATURE: 98.2 F | SYSTOLIC BLOOD PRESSURE: 152 MMHG

## 2021-09-12 LAB
ATRIAL RATE: 67 BPM
CALCULATED R AXIS, ECG10: 53 DEGREES
CALCULATED T AXIS, ECG11: 59 DEGREES
DIAGNOSIS, 93000: NORMAL
Q-T INTERVAL, ECG07: 452 MS
QRS DURATION, ECG06: 84 MS
QTC CALCULATION (BEZET), ECG08: 470 MS
VENTRICULAR RATE, ECG03: 65 BPM

## 2021-09-12 RX ORDER — ONDANSETRON 4 MG/1
4 TABLET, ORALLY DISINTEGRATING ORAL
Qty: 10 TABLET | Refills: 0 | Status: SHIPPED | OUTPATIENT
Start: 2021-09-12 | End: 2022-09-14

## 2021-09-12 RX ORDER — MECLIZINE HCL 12.5 MG 12.5 MG/1
12.5 TABLET ORAL
Qty: 30 TABLET | Refills: 0 | Status: SHIPPED | OUTPATIENT
Start: 2021-09-12 | End: 2021-09-22

## 2021-09-12 NOTE — DISCHARGE INSTRUCTIONS
--Meclizine 12.5 to 25 mg every 6 hours as needed for vertigo. --Zofran 4 mg every 6 hours as needed for nausea.

## 2021-09-12 NOTE — ED PROVIDER NOTES
JUAN Ortiz is an 79 yo woman who comes to the ED with vertigo that started about 3 hours ago while she was talking on the phone with her daughter. She had worsening of the dizziness with movement; she reports that it feels like the room is spinning. She has some nausea with the vertigo but she has had no vomiting. She has not fallen or had any trauma to her head, and she has had no headaches. No confusion or difficulty swallowing, speaking, and no unilateral weakness. Past Medical History:   Diagnosis Date    Anxiety disorder     Aortic insufficiency     Atrial fibrillation (HCC)     Warfarin stopped by PCP for lung lesion    Benign hypertensive heart disease without heart failure     Carotid bruit 7/26/2012    Constipation     Essential hypertension     Heart disease     HTN (hypertension)     Hypothyroid     Memory disorder     Mesothelioma ([address]     Mitral insufficiency     Pure hypercholesterolemia     Snoring     Unspecified cerebral artery occlusion with cerebral infarction        Past Surgical History:   Procedure Laterality Date    ECHO 2D ADULT  2009    mild LV systolic dysfunction, normal chamber dimensions, mild aortic regurgitation and mild mitral regurgitation. The ejection fraction was 45-50%.     HX APPENDECTOMY      HX CATARACT REMOVAL      HX TONSILLECTOMY           Family History:   Problem Relation Age of Onset    Stroke Mother     Stroke Father        Social History     Socioeconomic History    Marital status:      Spouse name: Not on file    Number of children: Not on file    Years of education: Not on file    Highest education level: Not on file   Occupational History    Not on file   Tobacco Use    Smoking status: Never Smoker    Smokeless tobacco: Never Used   Substance and Sexual Activity    Alcohol use: No    Drug use: No    Sexual activity: Not on file   Other Topics Concern    Not on file   Social History Narrative    Not on file     Social Determinants of Health     Financial Resource Strain:     Difficulty of Paying Living Expenses:    Food Insecurity:     Worried About Running Out of Food in the Last Year:     [address] in the Last Year:    Transportation Needs:     Lack of Transportation (Medical):  Lack of Transportation (Non-Medical):    Physical Activity:     Days of Exercise per Week:     Minutes of Exercise per Session:    Stress:     Feeling of Stress :    Social Connections:     Frequency of Communication with Friends and Family:     Frequency of Social Gatherings with Friends and Family:     Attends Hoah[employer]ism Services:     Active Member of Clubs or Organizations:     Attends Club or Organization Meetings:     Marital Status:    Intimate Partner Violence:     Fear of Current or Ex-Partner:     Emotionally Abused:     Physically Abused:     Sexually Abused: ALLERGIES: Flagyl [metronidazole] and Penicillins    Review of Systems   Constitutional: Negative for activity change, fatigue and fever. HENT: Negative. Negative for ear discharge, ear pain and sore throat. Eyes: Negative for pain and itching. Respiratory: Negative for cough, chest tightness and shortness of breath. Cardiovascular: Negative for chest pain and leg swelling. Gastrointestinal: Positive for nausea. Negative for constipation. Genitourinary: Negative for dysuria and hematuria. Musculoskeletal: Negative for back pain, joint swelling and neck pain. Skin: Negative for pallor and rash. Allergic/Immunologic: Negative for food allergies. Neurological: Positive for dizziness. Negative for weakness and numbness. Vitals:    09/11/21 2306   BP: (!) 167/72   Pulse: 65   Resp: 16            Physical Exam  Vitals reviewed. Constitutional:       General: She is not in acute distress. Appearance: She is well-developed. She is not diaphoretic. HENT:      Head: Normocephalic.       Right Ear: External ear normal.      Left Ear: External ear normal.      Nose: Nose normal.      Mouth/Throat:      Mouth: Mucous membranes are moist.   Eyes:      General: No scleral icterus. Right eye: No discharge. Left eye: No discharge. Conjunctiva/sclera: Conjunctivae normal.      Pupils: Pupils are equal, round, and reactive to light. Comments: Slight nystagmus to the right. Neck:      Thyroid: No thyromegaly. Trachea: No tracheal deviation. Cardiovascular:      Rate and Rhythm: Normal rate and regular rhythm. Heart sounds: Normal heart sounds. No murmur heard. No friction rub. No gallop. Pulmonary:      Effort: Pulmonary effort is normal. No respiratory distress. Breath sounds: Normal breath sounds. No wheezing or rales. Chest:      Chest wall: No tenderness. Abdominal:      General: Bowel sounds are normal. There is no distension. Palpations: Abdomen is soft. Tenderness: There is no abdominal tenderness. There is no guarding or rebound. Musculoskeletal:         General: No tenderness or deformity. Normal range of motion. Cervical back: Normal range of motion and neck supple. Right lower leg: No edema. Left lower leg: No edema. Skin:     General: Skin is warm and dry. Neurological:      General: No focal deficit present. Mental Status: She is alert and oriented to person, place, and time. Cranial Nerves: No cranial nerve deficit. Sensory: No sensory deficit. Motor: No weakness. Coordination: Coordination normal.      Deep Tendon Reflexes: Reflexes are normal and symmetric. Reflexes normal.          MDM This alert, elderly woman most likely has benign positional vertigo. We will not CT scan her at this point; if she improves significantly with meclizine and ondansetron, will discharge home with rx for meclizine. Will probably give a lower dose in the rx because of her small size and advanced age.         ED Course as of Sep 13 0559   Sun Sep 12, 2021   0053 Dizziness better after the meclizine and the ondansetron. Spoke with daughter. Working on a ride home.     [VG]      ED Course User Index  [VG] Shai Carbone MD       Imp: Benign positional vertigo    ED Course: After ondansetron and meclizine orally the patient was much less dizzy. She felt comfortable going home with a rx for meclizine. Signs and symptoms of stroke reviewed with patient. She was more concerned about bothering her neighbors and daughters at 46 than with her vertigo.     Plan: Discharge home           Rx meclizine 12.5 q 8 prn           F/U pcp this week      Flako Mccauley MD

## 2021-11-06 ENCOUNTER — HOSPITAL ENCOUNTER (EMERGENCY)
Age: 86
Discharge: HOME | End: 2021-11-06
Attending: FAMILY MEDICINE
Payer: MEDICARE

## 2021-11-06 VITALS
BODY MASS INDEX: 20.37 KG/M2 | TEMPERATURE: 97.6 F | OXYGEN SATURATION: 99 % | SYSTOLIC BLOOD PRESSURE: 141 MMHG | RESPIRATION RATE: 20 BRPM | HEART RATE: 63 BPM | WEIGHT: 115 LBS | DIASTOLIC BLOOD PRESSURE: 99 MMHG

## 2021-11-06 DIAGNOSIS — R10.13 ABDOMINAL PAIN, EPIGASTRIC: Primary | ICD-10-CM

## 2021-11-06 LAB
ALBUMIN SERPL-MCNC: 3.9 G/DL (ref 3.5–5)
ALBUMIN/GLOB SERPL: 0.8 {RATIO} (ref 1.1–2.2)
ALP SERPL-CCNC: 111 U/L (ref 45–117)
ALT SERPL-CCNC: 36 U/L (ref 12–78)
AMORPH CRY URNS QL MICRO: ABNORMAL
ANION GAP SERPL CALC-SCNC: 9 MMOL/L (ref 5–15)
APPEARANCE UR: CLEAR
AST SERPL-CCNC: 27 U/L (ref 15–37)
BACTERIA URNS QL MICRO: NEGATIVE /HPF
BASOPHILS # BLD: 0 K/UL (ref 0–0.1)
BASOPHILS NFR BLD: 0 % (ref 0–1)
BILIRUB SERPL-MCNC: 0.5 MG/DL (ref 0.2–1)
BILIRUB UR QL: NEGATIVE
BUN SERPL-MCNC: 19 MG/DL (ref 6–20)
BUN/CREAT SERPL: 26 (ref 12–20)
CALCIUM SERPL-MCNC: 9.2 MG/DL (ref 8.5–10.1)
CHLORIDE SERPL-SCNC: 100 MMOL/L (ref 97–108)
CO2 SERPL-SCNC: 28 MMOL/L (ref 21–32)
COLOR UR: ABNORMAL
CREAT SERPL-MCNC: 0.74 MG/DL (ref 0.55–1.02)
DIFFERENTIAL METHOD BLD: NORMAL
EOSINOPHIL # BLD: 0.1 K/UL (ref 0–0.4)
EOSINOPHIL NFR BLD: 1 % (ref 0–7)
EPITH CASTS URNS QL MICRO: ABNORMAL /LPF
ERYTHROCYTE [DISTWIDTH] IN BLOOD BY AUTOMATED COUNT: 13.6 % (ref 11.5–14.5)
GLOBULIN SER CALC-MCNC: 4.7 G/DL (ref 2–4)
GLUCOSE SERPL-MCNC: 90 MG/DL (ref 65–100)
GLUCOSE UR STRIP.AUTO-MCNC: NEGATIVE MG/DL
HCT VFR BLD AUTO: 37.6 % (ref 35–47)
HGB BLD-MCNC: 12.3 G/DL (ref 11.5–16)
HGB UR QL STRIP: ABNORMAL
IMM GRANULOCYTES # BLD AUTO: 0 K/UL (ref 0–0.04)
IMM GRANULOCYTES NFR BLD AUTO: 0 % (ref 0–0.5)
KETONES UR QL STRIP.AUTO: NEGATIVE MG/DL
LEUKOCYTE ESTERASE UR QL STRIP.AUTO: NEGATIVE
LIPASE SERPL-CCNC: 145 U/L (ref 73–393)
LYMPHOCYTES # BLD: 1.9 K/UL (ref 0.8–3.5)
LYMPHOCYTES NFR BLD: 27 % (ref 12–49)
MCH RBC QN AUTO: 28.8 PG (ref 26–34)
MCHC RBC AUTO-ENTMCNC: 32.7 G/DL (ref 30–36.5)
MCV RBC AUTO: 88.1 FL (ref 80–99)
MONOCYTES # BLD: 0.7 K/UL (ref 0–1)
MONOCYTES NFR BLD: 10 % (ref 5–13)
NEUTS SEG # BLD: 4.5 K/UL (ref 1.8–8)
NEUTS SEG NFR BLD: 62 % (ref 32–75)
NITRITE UR QL STRIP.AUTO: NEGATIVE
NRBC # BLD: 0 K/UL (ref 0–0.01)
NRBC BLD-RTO: 0 PER 100 WBC
PH UR STRIP: 6 [PH] (ref 5–8)
PLATELET # BLD AUTO: 226 K/UL (ref 150–400)
PMV BLD AUTO: 9.6 FL (ref 8.9–12.9)
POTASSIUM SERPL-SCNC: 4.1 MMOL/L (ref 3.5–5.1)
PROT SERPL-MCNC: 8.6 G/DL (ref 6.4–8.2)
PROT UR STRIP-MCNC: NEGATIVE MG/DL
RBC # BLD AUTO: 4.27 M/UL (ref 3.8–5.2)
RBC #/AREA URNS HPF: ABNORMAL /HPF (ref 0–5)
SODIUM SERPL-SCNC: 137 MMOL/L (ref 136–145)
SP GR UR REFRACTOMETRY: 1 (ref 1–1.03)
UROBILINOGEN UR QL STRIP.AUTO: 0.2 EU/DL (ref 0.2–1)
WBC # BLD AUTO: 7.3 K/UL (ref 3.6–11)
WBC URNS QL MICRO: ABNORMAL /HPF (ref 0–4)

## 2021-11-06 PROCEDURE — 36415 COLL VENOUS BLD VENIPUNCTURE: CPT

## 2021-11-06 PROCEDURE — 81001 URINALYSIS AUTO W/SCOPE: CPT

## 2021-11-06 PROCEDURE — 85025 COMPLETE CBC W/AUTO DIFF WBC: CPT

## 2021-11-06 PROCEDURE — 83690 ASSAY OF LIPASE: CPT

## 2021-11-06 PROCEDURE — 99284 EMERGENCY DEPT VISIT MOD MDM: CPT

## 2021-11-06 PROCEDURE — 93005 ELECTROCARDIOGRAM TRACING: CPT

## 2021-11-06 PROCEDURE — 74011250637 HC RX REV CODE- 250/637: Performed by: FAMILY MEDICINE

## 2021-11-06 PROCEDURE — 80053 COMPREHEN METABOLIC PANEL: CPT

## 2021-11-06 PROCEDURE — 74011000250 HC RX REV CODE- 250: Performed by: FAMILY MEDICINE

## 2021-11-06 RX ORDER — DICYCLOMINE HYDROCHLORIDE 20 MG/1
20 TABLET ORAL
Status: COMPLETED | OUTPATIENT
Start: 2021-11-06 | End: 2021-11-06

## 2021-11-06 RX ORDER — DICYCLOMINE HYDROCHLORIDE 10 MG/1
10 CAPSULE ORAL 4 TIMES DAILY
Qty: 15 CAPSULE | Refills: 0 | Status: SHIPPED | OUTPATIENT
Start: 2021-11-06 | End: 2022-02-24

## 2021-11-06 RX ORDER — DICYCLOMINE HYDROCHLORIDE 20 MG/1
20 TABLET ORAL EVERY 6 HOURS
Qty: 20 TABLET | Refills: 0 | Status: SHIPPED | OUTPATIENT
Start: 2021-11-06 | End: 2022-09-14

## 2021-11-06 RX ADMIN — DICYCLOMINE HYDROCHLORIDE 20 MG: 20 TABLET ORAL at 02:51

## 2021-11-06 RX ADMIN — LIDOCAINE HYDROCHLORIDE 40 ML: 20 SOLUTION ORAL; TOPICAL at 02:06

## 2021-11-06 RX ADMIN — DICYCLOMINE HYDROCHLORIDE 20 MG: 20 TABLET ORAL at 04:11

## 2021-11-06 NOTE — ED TRIAGE NOTES
Mid umbilicus abdominal pain that started 1500, patient took a antiacid with no relief. Patient states that is on cipro for a UTI.

## 2021-11-06 NOTE — ED PROVIDER NOTES
EMERGENCY DEPARTMENT HISTORY AND PHYSICAL EXAM          Date: 11/6/2021  Patient Name: Jaiden Meyre    History of Presenting Illness     Chief Complaint   Patient presents with    Abdominal Pain       History Provided By: Patient    HPI: Jaiden Meyer is a 80 y.o. female, pmhx   , who was brought to the ED c/o abdominal pain that started yesterday around 1500. The pain was periumbilical, constant, between sharp and dull, actually crampy. She has never had the pain before. She tried eating her avacado at 1830 last night, which helped that pain slightly but the pain came back. No nausea, vomiting. Had been constipated, but this am she had a good BM after taking an extra . No fevers, and she reports a few chills. Nothing makes the pain worse or better. PCP: Katy Dawson MD (Inactive)    Allergies: NKDA  Social Hx: Lives alone locally. Does not smoke or drink. There are no other complaints, changes, or physical findings at this time. Current Outpatient Medications   Medication Sig Dispense Refill    dicyclomine (BENTYL) 20 mg tablet Take 1 Tablet by mouth every six (6) hours. 20 Tablet 0    dicyclomine (BentyL) 10 mg capsule Take 1 Capsule by mouth four (4) times daily. 15 Capsule 0    ondansetron (Zofran ODT) 4 mg disintegrating tablet Take 1 Tablet by mouth every eight (8) hours as needed for Nausea. 10 Tablet 0    amLODIPine (NORVASC) 5 mg tablet TAKE 1 TABLET DAILY 90 Tablet 3    metoprolol tartrate (LOPRESSOR) 25 mg tablet TAKE 1 TABLET BY MOUTH AS NEEDED (Patient not taking: Reported on 9/3/2021) 30 Tab 1    OTHER IB Guard po.  Bifidobacterium infantis (ALIGN PO) Take  by mouth.  cranberry fruit extract (CRANBERRY PO) Take  by mouth two (2) times a day.  metoprolol succinate (TOPROL-XL) 50 mg XL tablet Take 1 Tab by mouth two (2) times a day. (Patient taking differently: Take 50 mg by mouth daily. ) 180 Tab 1    melatonin 3 mg tablet Take 1 Tab by mouth nightly.  Takes for 3 weeks then stops a week before restarting      OTHER Fiber Con po      ipratropium (ATROVENT) 0.03 % nasal spray 2 Sprays daily. Indications: For Allergies (Patient not taking: Reported on 9/3/2021)      polyethylene glycol (MIRALAX) 17 gram packet Take 17 g by mouth as needed. Indications: Patient seldom takes it.  EPINEPHrine (EPIPEN) 0.3 mg/0.3 mL injection 0.3 mg by IntraMUSCular route once as needed. (Patient not taking: Reported on 9/3/2021)      OTHER AMITIZA one TABLET DAILY      multivitamin (ONE A DAY) tablet Take 1 Tab by mouth daily.  loratadine (CLARITIN) 10 mg tablet Take 10 mg by mouth daily.  rivaroxaban (XARELTO) 15 mg tab tablet Take  by mouth daily (with dinner).  atorvastatin (LIPITOR) 20 mg tablet Take 1 Tab by mouth daily. (Patient taking differently: Take 20 mg by mouth nightly.) 60 Tab 3    omeprazole (PRILOSEC) 20 mg capsule Take 40 mg by mouth daily. Takes 40mg a day      Cholecalciferol, Vitamin D3, (VITAMIN D3) 1,000 unit cap Take  by mouth daily. Taking 5000 units      ALPRAZolam (XANAX) 0.25 mg tablet Take 0.25 mg by mouth nightly.  CA CARBONATE/VITAMIN D3/VIT K (VIACTIV PO) Take  by mouth nightly.  levothyroxine (SYNTHROID) 25 mcg tablet Take 50 mcg by mouth Daily (before breakfast).          Past History     Past Medical History:  Past Medical History:   Diagnosis Date    Anxiety disorder     Aortic insufficiency     Atrial fibrillation (HCC)     Warfarin stopped by PCP for lung lesion    Benign hypertensive heart disease without heart failure     Carotid bruit 7/26/2012    Constipation     Essential hypertension     Heart disease     HTN (hypertension)     Hypothyroid     Memory disorder     [facility]othelioma ([facility] [address]     Mitral insufficiency     Pure hypercholesterolemia     Snoring     Unspecified cerebral artery occlusion with cerebral infarction        Past Surgical History:  Past Surgical History:   Procedure Laterality Date    ECHO 2D ADULT  2009    mild LV systolic dysfunction, normal chamber dimensions, mild aortic regurgitation and mild mitral regurgitation. The ejection fraction was 45-50%.  HX APPENDECTOMY      HX CATARACT REMOVAL      HX TONSILLECTOMY         Family History:  Family History   Problem Relation Age of Onset    Stroke Mother     Stroke Father        Social History:  Social History     Tobacco Use    Smoking status: Never Smoker    Smokeless tobacco: Never Used   Substance Use Topics    Alcohol use: No    Drug use: No       Allergies: Allergies   Allergen Reactions    Flagyl [Metronidazole] Nausea and Vomiting    Penicillins Nausea and Vomiting         Review of Systems   Review of Systems   Constitutional: Negative for appetite change and fever. HENT: Negative for congestion, sinus pain and trouble swallowing. Respiratory: Negative for cough and shortness of breath. Cardiovascular: Negative for chest pain and leg swelling. Gastrointestinal: Positive for abdominal pain and constipation. Negative for nausea and vomiting. Endocrine: Negative for polyphagia and polyuria. Genitourinary: Negative for difficulty urinating and dysuria. Musculoskeletal: Negative for back pain and neck pain. Skin: Negative for rash. Neurological: Negative for light-headedness and headaches. Hematological: Does not bruise/bleed easily. Physical Exam   Physical Exam  Vitals reviewed. Constitutional:       General: She is not in acute distress. Appearance: She is well-developed. She is not diaphoretic. HENT:      Head: Normocephalic and atraumatic. Right Ear: External ear normal.      Left Ear: External ear normal.      Nose: Nose normal.   Eyes:      General: No scleral icterus. Right eye: No discharge. Left eye: No discharge. Conjunctiva/sclera: Conjunctivae normal.      Pupils: Pupils are equal, round, and reactive to light. Neck:      Thyroid: No thyromegaly.       Trachea: No tracheal deviation. Cardiovascular:      Rate and Rhythm: Normal rate and regular rhythm. Heart sounds: Normal heart sounds. No murmur heard. No friction rub. No gallop. Pulmonary:      Effort: Pulmonary effort is normal. No respiratory distress. Breath sounds: Normal breath sounds. No wheezing or rales. Chest:      Chest wall: No tenderness. Abdominal:      General: Abdomen is scaphoid. Bowel sounds are increased. There is no distension. Palpations: Abdomen is soft. Tenderness: There is abdominal tenderness in the epigastric area and periumbilical area. There is no guarding or rebound. Negative signs include Colbert's sign. Hernia: No hernia is present. Musculoskeletal:         General: No tenderness or deformity. Normal range of motion. Cervical back: Normal range of motion and neck supple. Skin:     General: Skin is warm and dry. Neurological:      Mental Status: She is alert and oriented to person, place, and time. Cranial Nerves: No cranial nerve deficit. Coordination: Coordination normal.      Deep Tendon Reflexes: Reflexes are normal and symmetric.  Reflexes normal.         Diagnostic Study Results     Labs -     Recent Results (from the past 12 hour(s))   URINALYSIS W/MICROSCOPIC    Collection Time: 11/06/21  1:32 AM   Result Value Ref Range    Color YELLOW/STRAW      Appearance CLEAR CLEAR      Specific gravity 1.005 1.003 - 1.030      pH (UA) 6.0 5.0 - 8.0      Protein Negative NEG mg/dL    Glucose Negative NEG mg/dL    Ketone Negative NEG mg/dL    Bilirubin Negative NEG      Blood SMALL (A) NEG      Urobilinogen 0.2 0.2 - 1.0 EU/dL    Nitrites Negative NEG      Leukocyte Esterase Negative NEG      WBC 0-4 0 - 4 /hpf    RBC 10-20 0 - 5 /hpf    Epithelial cells FEW FEW /lpf    Bacteria Negative NEG /hpf    Amorphous Crystals FEW (A) NEG     CBC WITH AUTOMATED DIFF    Collection Time: 11/06/21  1:35 AM   Result Value Ref Range    WBC 7.3 3.6 - 11.0 K/uL    RBC 4.27 3.80 - 5.20 M/uL    HGB 12.3 11.5 - 16.0 g/dL    HCT 37.6 35.0 - 47.0 %    MCV 88.1 80.0 - 99.0 FL    MCH 28.8 26.0 - 34.0 PG    MCHC 32.7 30.0 - 36.5 g/dL    RDW 13.6 11.5 - 14.5 %    PLATELET 266 864 - 436 K/uL    MPV 9.6 8.9 - 12.9 FL    NRBC 0.0 0  WBC    ABSOLUTE NRBC 0.00 0.00 - 0.01 K/uL    NEUTROPHILS 62 32 - 75 %    LYMPHOCYTES 27 12 - 49 %    MONOCYTES 10 5 - 13 %    EOSINOPHILS 1 0 - 7 %    BASOPHILS 0 0 - 1 %    IMMATURE GRANULOCYTES 0 0.0 - 0.5 %    ABS. NEUTROPHILS 4.5 1.8 - 8.0 K/UL    ABS. LYMPHOCYTES 1.9 0.8 - 3.5 K/UL    ABS. MONOCYTES 0.7 0.0 - 1.0 K/UL    ABS. EOSINOPHILS 0.1 0.0 - 0.4 K/UL    ABS. BASOPHILS 0.0 0.0 - 0.1 K/UL    ABS. IMM. GRANS. 0.0 0.00 - 0.04 K/UL    DF AUTOMATED     METABOLIC PANEL, COMPREHENSIVE    Collection Time: 11/06/21  1:35 AM   Result Value Ref Range    Sodium 137 136 - 145 mmol/L    Potassium 4.1 3.5 - 5.1 mmol/L    Chloride 100 97 - 108 mmol/L    CO2 28 21 - 32 mmol/L    Anion gap 9 5 - 15 mmol/L    Glucose 90 65 - 100 mg/dL    BUN 19 6 - 20 MG/DL    Creatinine 0.74 0.55 - 1.02 MG/DL    BUN/Creatinine ratio 26 (H) 12 - 20      GFR est AA >60 >60 ml/min/1.73m2    GFR est non-AA >60 >60 ml/min/1.73m2    Calcium 9.2 8.5 - 10.1 MG/DL    Bilirubin, total 0.5 0.2 - 1.0 MG/DL    ALT (SGPT) 36 12 - 78 U/L    AST (SGOT) 27 15 - 37 U/L    Alk. phosphatase 111 45 - 117 U/L    Protein, total 8.6 (H) 6.4 - 8.2 g/dL    Albumin 3.9 3.5 - 5.0 g/dL    Globulin 4.7 (H) 2.0 - 4.0 g/dL    A-G Ratio 0.8 (L) 1.1 - 2.2     LIPASE    Collection Time: 11/06/21  1:35 AM   Result Value Ref Range    Lipase 145 73 - 393 U/L       Radiologic Studies -   No orders to display     CT Results  (Last 48 hours)    None        CXR Results  (Last 48 hours)    None            Medical Decision Making   I am the first provider for this patient.     I reviewed the vital signs, available nursing notes, past medical history, past surgical history, family history and social history. Vital Signs-Reviewed the patient's vital signs. Patient Vitals for the past 12 hrs:   Temp Pulse Resp BP SpO2   11/06/21 0132 97.6 °F (36.4 °C) 63 20 (!) 141/99 99 %       Pulse Oximetry Analysis - 99% on RA      Records Reviewed: Nursing Notes, Old Medical Records and Previous Laboratory Studies    Provider Notes (Medical Decision Making):   MDM: Pt with epigastric/periumbilical abdominal pain that started around 10 hours ago. ED Course:   Initial assessment performed. The patients presenting problems have been discussed, and they are in agreement with the care plan formulated and outlined with them. I have encouraged them to ask questions as they arise throughout their visit. EKG interpretation: (Preliminary)  Rhythm: Junctional rhythm, HR 65, No ST changes. This EKG was interpreted by ED Provider Dr Gurwinder rGande MD        PROGRESS NOTE:    ED Course as of 11/06/21 0419   Sat Nov 06, 2021   0244 A bit better (around 20%) after the GI cocktail. Will give Bentyl 20 mg po. Am going to stop abx for the UTI because UA is negative. [VG]   [id number] Feeling better after the Bentyl. Talking to daughter in [place]. [VG]      ED Course User Index  [VG] Tory Jama MD        Discharge note:  Pt re-evaluated and noted to be feeling better, ready for discharge. Updated pt on all final lab findings. Will follow up as instructed. All questions have been answered, pt voiced understanding and agreement with plan. Specific return precautions provided as well as instructions to return to the ED should sx worsen at any time. Vital signs stable for discharge. Critical Care Time:   0      Diagnosis     Clinical Impression:   1. Abdominal pain, epigastric        PLAN:  1. Current Discharge Medication List      START taking these medications    Details   dicyclomine (BENTYL) 20 mg tablet Take 1 Tablet by mouth every six (6) hours.   Qty: 20 Tablet, Refills: 0  Start date: 11/6/2021         CONTINUE these medications which have CHANGED    Details   dicyclomine (BentyL) 10 mg capsule Take 1 Capsule by mouth four (4) times daily. Qty: 15 Capsule, Refills: 0  Start date: 11/6/2021           2.  Cubbage  Follow-up Information    None       Return to ED if worse     Disposition:  Home

## 2021-11-06 NOTE — DISCHARGE INSTRUCTIONS
--Stop the Cipro for now. --Dicyclomine 20 mg every 6 hours or 30 minutes before meals for the next few days.

## 2021-11-08 LAB
ATRIAL RATE: 375 BPM
CALCULATED R AXIS, ECG10: 74 DEGREES
CALCULATED T AXIS, ECG11: 28 DEGREES
DIAGNOSIS, 93000: NORMAL
Q-T INTERVAL, ECG07: 464 MS
QRS DURATION, ECG06: 74 MS
QTC CALCULATION (BEZET), ECG08: 482 MS
VENTRICULAR RATE, ECG03: 65 BPM

## 2022-02-24 ENCOUNTER — HOSPITAL ENCOUNTER (EMERGENCY)
Age: 87
Discharge: HOME | End: 2022-02-24
Attending: FAMILY MEDICINE
Payer: MEDICARE

## 2022-02-24 VITALS
WEIGHT: 84 LBS | BODY MASS INDEX: 14.88 KG/M2 | DIASTOLIC BLOOD PRESSURE: 58 MMHG | OXYGEN SATURATION: 99 % | RESPIRATION RATE: 16 BRPM | SYSTOLIC BLOOD PRESSURE: 122 MMHG | HEART RATE: 55 BPM | HEIGHT: 63 IN | TEMPERATURE: 98.1 F

## 2022-02-24 DIAGNOSIS — R33.9 URINARY RETENTION: Primary | ICD-10-CM

## 2022-02-24 LAB
ALBUMIN SERPL-MCNC: 3.3 G/DL (ref 3.5–5)
ALBUMIN/GLOB SERPL: 0.7 {RATIO} (ref 1.1–2.2)
ALP SERPL-CCNC: 108 U/L (ref 45–117)
ALT SERPL-CCNC: 33 U/L (ref 12–78)
ANION GAP SERPL CALC-SCNC: 6 MMOL/L (ref 5–15)
APPEARANCE UR: CLEAR
AST SERPL-CCNC: 26 U/L (ref 15–37)
BACTERIA URNS QL MICRO: ABNORMAL /HPF
BASOPHILS # BLD: 0 K/UL (ref 0–0.1)
BASOPHILS NFR BLD: 1 % (ref 0–1)
BILIRUB SERPL-MCNC: 0.5 MG/DL (ref 0.2–1)
BILIRUB UR QL: NEGATIVE
BUN SERPL-MCNC: 22 MG/DL (ref 6–20)
BUN/CREAT SERPL: 31 (ref 12–20)
CALCIUM SERPL-MCNC: 9.2 MG/DL (ref 8.5–10.1)
CHLORIDE SERPL-SCNC: 100 MMOL/L (ref 97–108)
CO2 SERPL-SCNC: 26 MMOL/L (ref 21–32)
COLOR UR: ABNORMAL
CREAT SERPL-MCNC: 0.71 MG/DL (ref 0.55–1.02)
DIFFERENTIAL METHOD BLD: ABNORMAL
EOSINOPHIL # BLD: 0.1 K/UL (ref 0–0.4)
EOSINOPHIL NFR BLD: 2 % (ref 0–7)
EPITH CASTS URNS QL MICRO: ABNORMAL /LPF
ERYTHROCYTE [DISTWIDTH] IN BLOOD BY AUTOMATED COUNT: 14.2 % (ref 11.5–14.5)
GLOBULIN SER CALC-MCNC: 4.6 G/DL (ref 2–4)
GLUCOSE SERPL-MCNC: 96 MG/DL (ref 65–100)
GLUCOSE UR STRIP.AUTO-MCNC: NEGATIVE MG/DL
HCT VFR BLD AUTO: 33.6 % (ref 35–47)
HGB BLD-MCNC: 11.3 G/DL (ref 11.5–16)
HGB UR QL STRIP: ABNORMAL
IMM GRANULOCYTES # BLD AUTO: 0 K/UL (ref 0–0.04)
IMM GRANULOCYTES NFR BLD AUTO: 0 % (ref 0–0.5)
KETONES UR QL STRIP.AUTO: NEGATIVE MG/DL
LEUKOCYTE ESTERASE UR QL STRIP.AUTO: ABNORMAL
LYMPHOCYTES # BLD: 1.6 K/UL (ref 0.8–3.5)
LYMPHOCYTES NFR BLD: 27 % (ref 12–49)
MCH RBC QN AUTO: 28.8 PG (ref 26–34)
MCHC RBC AUTO-ENTMCNC: 33.6 G/DL (ref 30–36.5)
MCV RBC AUTO: 85.7 FL (ref 80–99)
MONOCYTES # BLD: 0.6 K/UL (ref 0–1)
MONOCYTES NFR BLD: 11 % (ref 5–13)
MUCOUS THREADS URNS QL MICRO: ABNORMAL /LPF
NEUTS SEG # BLD: 3.5 K/UL (ref 1.8–8)
NEUTS SEG NFR BLD: 59 % (ref 32–75)
NITRITE UR QL STRIP.AUTO: NEGATIVE
NRBC # BLD: 0 K/UL (ref 0–0.01)
NRBC BLD-RTO: 0 PER 100 WBC
PH UR STRIP: 6.5 [PH] (ref 5–8)
PLATELET # BLD AUTO: 197 K/UL (ref 150–400)
PMV BLD AUTO: 9.9 FL (ref 8.9–12.9)
POTASSIUM SERPL-SCNC: 3.9 MMOL/L (ref 3.5–5.1)
PROT SERPL-MCNC: 7.9 G/DL (ref 6.4–8.2)
PROT UR STRIP-MCNC: NEGATIVE MG/DL
RBC # BLD AUTO: 3.92 M/UL (ref 3.8–5.2)
RBC #/AREA URNS HPF: ABNORMAL /HPF (ref 0–5)
SODIUM SERPL-SCNC: 132 MMOL/L (ref 136–145)
SP GR UR REFRACTOMETRY: 1.01 (ref 1–1.03)
UA: UC IF INDICATED,UAUC: ABNORMAL
UROBILINOGEN UR QL STRIP.AUTO: 0.2 EU/DL (ref 0.2–1)
WBC # BLD AUTO: 5.9 K/UL (ref 3.6–11)
WBC URNS QL MICRO: ABNORMAL /HPF (ref 0–4)

## 2022-02-24 PROCEDURE — 87186 SC STD MICRODIL/AGAR DIL: CPT

## 2022-02-24 PROCEDURE — 87086 URINE CULTURE/COLONY COUNT: CPT

## 2022-02-24 PROCEDURE — 77030034849

## 2022-02-24 PROCEDURE — 87077 CULTURE AEROBIC IDENTIFY: CPT

## 2022-02-24 PROCEDURE — 36415 COLL VENOUS BLD VENIPUNCTURE: CPT

## 2022-02-24 PROCEDURE — 85025 COMPLETE CBC W/AUTO DIFF WBC: CPT

## 2022-02-24 PROCEDURE — 51702 INSERT TEMP BLADDER CATH: CPT

## 2022-02-24 PROCEDURE — 80053 COMPREHEN METABOLIC PANEL: CPT

## 2022-02-24 PROCEDURE — 99283 EMERGENCY DEPT VISIT LOW MDM: CPT

## 2022-02-24 PROCEDURE — 81001 URINALYSIS AUTO W/SCOPE: CPT

## 2022-02-24 RX ORDER — CALCIUM POLYCARBOPHIL 625 MG
625 TABLET ORAL
COMMUNITY

## 2022-02-24 RX ORDER — SODIUM CHLORIDE 0.9 % (FLUSH) 0.9 %
5-10 SYRINGE (ML) INJECTION ONCE
Status: DISCONTINUED | OUTPATIENT
Start: 2022-02-24 | End: 2022-02-24 | Stop reason: HOSPADM

## 2022-02-24 RX ORDER — LUBIPROSTONE 8 UG/1
8 CAPSULE, GELATIN COATED ORAL
COMMUNITY
Start: 2021-10-11

## 2022-02-24 RX ORDER — MECLIZINE HCL 12.5 MG 12.5 MG/1
TABLET ORAL
COMMUNITY
Start: 2021-09-12 | End: 2022-09-14

## 2022-02-24 NOTE — DISCHARGE INSTRUCTIONS
Return for fever, change in mentation, abdominal pain, uncontrolled pain, gross blood in urine. Follow-up urine culture results and current lab results with Dr. Cheyenne Yadav in 2 days.

## 2022-02-24 NOTE — ED PROVIDER NOTES
[address] [place] [address]   EMERGENCY DEPARTMENT          Date: 2/24/2022  Patient: Regine Awad MRN: [id number]  SSN: [id number]    YOB: 1932  Age: 80 y.o. Sex: female      PCP: Nyasia Henson MD (Inactive)  The patient arrived by ambulance and is alone. History of Presenting Illness     Chief Complaint   Patient presents with    Urinary Catheter Problem       History Provided By: Patient    HPI: Regine Awad is a 80 y.o. female, pmhx absence epilepsy, aortic valve insufficiency, atrial fibrillation on Xarelto, hypertension, CVA, chronic UTI, chronic indwelling Cameron, IBS, mesothelioma, hyperlipidemia, status post thoracoscopy with talc pleurodesis, who presents via EMS to the ED c/o lower abdominal pain and pressure started tonight after she went to bed. No fever, sweats, chills. Home health staff noted that her Cameron catheter was cloudy and they referred her to ER for evaluation. No nausea or vomiting. No chest pain heaviness pressure or shortness of breath. No diarrhea or constipation. Daughter drove down from [place] and took patient home. Past History     Past Medical History:   Diagnosis Date    Anxiety disorder     Aortic insufficiency     Atrial fibrillation (HCC)     Warfarin stopped by PCP for lung lesion    Benign hypertensive heart disease without heart failure     Carotid bruit 7/26/2012    Constipation     Essential hypertension     Heart disease     HTN (hypertension)     Hypothyroid     Memory disorder     Mesothelioma ([facility] [address]     Mitral insufficiency     Pure hypercholesterolemia     Snoring     Unspecified cerebral artery occlusion with cerebral infarction        Past Surgical History:   Procedure Laterality Date    ECHO 2D ADULT  2009    mild LV systolic dysfunction, normal chamber dimensions, mild aortic regurgitation and mild mitral regurgitation. The ejection fraction was 45-50%.     HX APPENDECTOMY      HX CATARACT REMOVAL      HX TONSILLECTOMY         Family History   Problem Relation Age of Onset    Stroke Mother     Stroke Father        Social History     Tobacco Use    Smoking status: Never Smoker    Smokeless tobacco: Never Used   Substance Use Topics    Alcohol use: No    Drug use: No       Allergies   Allergen Reactions    Flagyl [Metronidazole] Nausea and Vomiting    Penicillins Nausea and Vomiting       Current Facility-Administered Medications   Medication Dose Route Frequency Provider Last Rate Last Admin    sodium chloride (NS) flush 5-10 mL  5-10 mL IntraVENous Mitchell Heimlich, MD         Current Outpatient Medications   Medication Sig Dispense Refill    lubiPROStone (Amitiza) 8 mcg capsule       meclizine (ANTIVERT) 12.5 mg tablet       calcium polycarbophiL (FIBERCON) 625 mg tablet Take 625 mg by mouth.  CRANBERRY PO Take  by mouth.  peppermint oil (IBGARD PO) Take 180 mg by mouth daily.  dicyclomine (BENTYL) 20 mg tablet Take 1 Tablet by mouth every six (6) hours. 20 Tablet 0    ondansetron (Zofran ODT) 4 mg disintegrating tablet Take 1 Tablet by mouth every eight (8) hours as needed for Nausea. 10 Tablet 0    amLODIPine (NORVASC) 5 mg tablet TAKE 1 TABLET DAILY 90 Tablet 3    metoprolol tartrate (LOPRESSOR) 25 mg tablet TAKE 1 TABLET BY MOUTH AS NEEDED (Patient not taking: Reported on 9/3/2021) 30 Tab 1    Bifidobacterium infantis (ALIGN PO) Take  by mouth.  cranberry fruit extract (CRANBERRY PO) Take  by mouth two (2) times a day.  metoprolol succinate (TOPROL-XL) 50 mg XL tablet Take 1 Tab by mouth two (2) times a day. (Patient taking differently: Take 50 mg by mouth daily. ) 180 Tab 1    melatonin 3 mg tablet Take 1 Tab by mouth nightly. Takes for 3 weeks then stops a week before restarting      ipratropium (ATROVENT) 0.03 % nasal spray 2 Sprays daily. Indications:  For Allergies (Patient not taking: Reported on 9/3/2021)      polyethylene glycol (MIRALAX) 17 gram packet Take 17 g by mouth as needed. Indications: Patient seldom takes it.  EPINEPHrine (EPIPEN) 0.3 mg/0.3 mL injection 0.3 mg by IntraMUSCular route once as needed. (Patient not taking: Reported on 9/3/2021)      multivitamin (ONE A DAY) tablet Take 1 Tab by mouth daily.  loratadine (CLARITIN) 10 mg tablet Take 10 mg by mouth daily.  rivaroxaban (XARELTO) 15 mg tab tablet Take  by mouth daily (with dinner).  atorvastatin (LIPITOR) 20 mg tablet Take 1 Tab by mouth daily. (Patient taking differently: Take 20 mg by mouth nightly.) 60 Tab 3    omeprazole (PRILOSEC) 20 mg capsule Take 40 mg by mouth daily. Takes 40mg a day      Cholecalciferol, Vitamin D3, (VITAMIN D3) 1,000 unit cap Take  by mouth daily. Taking 5000 units      ALPRAZolam (XANAX) 0.25 mg tablet Take 0.25 mg by mouth nightly.  CA CARBONATE/VITAMIN D3/VIT K (VIACTIV PO) Take  by mouth nightly.  levothyroxine (SYNTHROID) 25 mcg tablet Take 50 mcg by mouth Daily (before breakfast). Review of Systems       Review of Systems   Constitutional: Negative for activity change, appetite change, chills, diaphoresis, fatigue and fever. HENT: Negative. Respiratory: Negative for cough, chest tightness and shortness of breath. Cardiovascular: Negative for chest pain and leg swelling. Gastrointestinal: Positive for abdominal pain. Negative for blood in stool, diarrhea, nausea and vomiting. Genitourinary: Positive for decreased urine volume and difficulty urinating. Negative for flank pain and hematuria. Neurological: Negative. Hematological: Does not bruise/bleed easily. Physical Exam     Physical Exam  Vitals and nursing note reviewed. Constitutional:       General: She is not in acute distress. Appearance: Normal appearance. She is well-developed and normal weight. She is not ill-appearing, toxic-appearing or diaphoretic. HENT:      Head: Normocephalic.    Eyes:      Extraocular Movements: Extraocular movements intact. Pupils: Pupils are equal, round, and reactive to light. Neck:      Vascular: No JVD. Cardiovascular:      Rate and Rhythm: Normal rate and regular rhythm. Heart sounds: Normal heart sounds. Heart sounds not distant. No murmur heard. No systolic murmur is present. No diastolic murmur is present. Pulmonary:      Effort: Pulmonary effort is normal. No tachypnea, accessory muscle usage or respiratory distress. Breath sounds: Normal breath sounds. Chest:      Chest wall: No tenderness. Abdominal:      Palpations: Abdomen is soft. There is no hepatomegaly or splenomegaly. Tenderness: There is no abdominal tenderness. There is no guarding or rebound. Comments: Abdomen is flat soft and nontender without mass after bladder was decompressed by nursing staff. Cameron catheter which was in place was with cloudy urine and sediment noted in the tube. After Cameron catheter was placed patient had immediate relief of her pain, urine that was produced was clear in color. Musculoskeletal:         General: No swelling or tenderness. Normal range of motion. Cervical back: Normal range of motion and neck supple. No rigidity. Right lower leg: No tenderness. No edema. Left lower leg: No tenderness. No edema. Skin:     General: Skin is warm and dry. Capillary Refill: Capillary refill takes less than 2 seconds. Findings: No rash. Neurological:      General: No focal deficit present. Mental Status: She is alert and oriented to person, place, and time. Cranial Nerves: No cranial nerve deficit. Sensory: No sensory deficit. Motor: No weakness. Psychiatric:         Mood and Affect: Mood normal.         Behavior: Behavior normal.         Thought Content:  Thought content normal.         Judgment: Judgment normal.           Diagnostic Study Results     Labs -     Recent Results (from the past 48 hour(s))   URINALYSIS W/ REFLEX CULTURE    Collection Time: 02/24/22  1:30 AM    Specimen: Urine   Result Value Ref Range    Color YELLOW/STRAW      Appearance CLEAR CLEAR      Specific gravity 1.006 1.003 - 1.030      pH (UA) 6.5 5.0 - 8.0      Protein Negative NEG mg/dL    Glucose Negative NEG mg/dL    Ketone Negative NEG mg/dL    Bilirubin Negative NEG      Blood LARGE (A) NEG      Urobilinogen 0.2 0.2 - 1.0 EU/dL    Nitrites Negative NEG      Leukocyte Esterase TRACE (A) NEG      WBC 5-10 0 - 4 /hpf    RBC 10-20 0 - 5 /hpf    Epithelial cells FEW FEW /lpf    Bacteria 1+ (A) NEG /hpf    UA:UC IF INDICATED CULTURE NOT INDICATED BY UA RESULT CNI      Mucus TRACE /lpf   CBC WITH AUTOMATED DIFF    Collection Time: 02/24/22  1:55 AM   Result Value Ref Range    WBC 5.9 3.6 - 11.0 K/uL    RBC 3.92 3.80 - 5.20 M/uL    HGB 11.3 (L) 11.5 - 16.0 g/dL    HCT 33.6 (L) 35.0 - 47.0 %    MCV 85.7 80.0 - 99.0 FL    MCH 28.8 26.0 - 34.0 PG    MCHC 33.6 30.0 - 36.5 g/dL    RDW 14.2 11.5 - 14.5 %    PLATELET 629 183 - 268 K/uL    MPV 9.9 8.9 - 12.9 FL    NRBC 0.0 0  WBC    ABSOLUTE NRBC 0.00 0.00 - 0.01 K/uL    NEUTROPHILS 59 32 - 75 %    LYMPHOCYTES 27 12 - 49 %    MONOCYTES 11 5 - 13 %    EOSINOPHILS 2 0 - 7 %    BASOPHILS 1 0 - 1 %    IMMATURE GRANULOCYTES 0 0.0 - 0.5 %    ABS. NEUTROPHILS 3.5 1.8 - 8.0 K/UL    ABS. LYMPHOCYTES 1.6 0.8 - 3.5 K/UL    ABS. MONOCYTES 0.6 0.0 - 1.0 K/UL    ABS. EOSINOPHILS 0.1 0.0 - 0.4 K/UL    ABS. BASOPHILS 0.0 0.0 - 0.1 K/UL    ABS. IMM.  GRANS. 0.0 0.00 - 0.04 K/UL    DF AUTOMATED     METABOLIC PANEL, COMPREHENSIVE    Collection Time: 02/24/22  1:55 AM   Result Value Ref Range    Sodium 132 (L) 136 - 145 mmol/L    Potassium 3.9 3.5 - 5.1 mmol/L    Chloride 100 97 - 108 mmol/L    CO2 26 21 - 32 mmol/L    Anion gap 6 5 - 15 mmol/L    Glucose 96 65 - 100 mg/dL    BUN 22 (H) 6 - 20 MG/DL    Creatinine 0.71 0.55 - 1.02 MG/DL    BUN/Creatinine ratio 31 (H) 12 - 20      GFR est AA >60 >60 ml/min/1.73m2    GFR est non-AA >60 >60 ml/min/1.73m2    Calcium 9.2 8.5 - 10.1 MG/DL    Bilirubin, total 0.5 0.2 - 1.0 MG/DL    ALT (SGPT) 33 12 - 78 U/L    AST (SGOT) 26 15 - 37 U/L    Alk. phosphatase 108 45 - 117 U/L    Protein, total 7.9 6.4 - 8.2 g/dL    Albumin 3.3 (L) 3.5 - 5.0 g/dL    Globulin 4.6 (H) 2.0 - 4.0 g/dL    A-G Ratio 0.7 (L) 1.1 - 2.2          Radiologic Studies -   CT Results  (Last 48 hours)    None        No orders to display         Procedures     Procedures      Medical Decision Making     Records Reviewed: Nursing Notes, Old Medical Records and Previous Laboratory Studies    Vital Signs  Patient Vitals for the past 24 hrs:   Temp Pulse Resp BP SpO2   02/24/22 0300 -- (!) 55 16 (!) 122/58 99 %   02/24/22 0232 -- (!) 54 14 135/63 97 %   02/24/22 0141 -- 62 16 (!) 155/71 98 %   02/24/22 0107 98.1 °F (36.7 °C) 62 16 (!) 214/89 100 %       Pulse Oximetry Analysis - 99% on RA      I am the first provider for this patient. I reviewed the vital signs, available nursing notes, past medical history, past surgical history, family history and social history, performed a physical exam and reviewed labs and xrays from this visit    MDM  Number of Diagnoses or Management Options  Urinary retention: new, needed workup     Amount and/or Complexity of Data Reviewed  Clinical lab tests: ordered and reviewed  Tests in the radiology section of CPT®: ordered and reviewed  Tests in the medicine section of CPT®: ordered and reviewed  Obtain history from someone other than the patient: yes (Home health)  Review and summarize past medical records: yes    Risk of Complications, Morbidity, and/or Mortality  Presenting problems: moderate  Diagnostic procedures: moderate  Management options: moderate  General comments: Differential includes urinary tract infection, urinary retention, Cameron malfunction, bowel obstruction, constipation    Patient Progress  Patient progress: improved        ED Course     Initial assessment performed.  The patients presenting problems have been discussed, and they are in agreement with the care plan formulated and outlined with them. I have encouraged them to ask questions as they arise throughout their visit. ED Course as of 02/24/22 0417   Thu Feb 24, 2022   7050 Patient says she felt much better after catheter replaced and 800 cc of urine drained from bladder. Urine continue to be clear throughout her ER visit. Labs reviewed, hemoglobin slightly lower than her usual baseline at 1211.3 otherwise normal, CMP was stable. Urinalysis with5-10 white cells, 10-20 red cells 1+ bacteria few epithelial cells. Urine culture is pending. Care discussed with patient and her daughter. She said she felt well enough to go home. Discharged home to follow-up with her primary care doctor within the next 2 to 3 days. I have not treated urine, awaiting urine culture results. Because of patient's urinary retention is probably the sediment in her catheter which blocked it causing the obstruction. It is unclear where the sediment came from. [PS]      ED Course User Index  [PS] Mat Quinones MD        Orders Placed This Encounter    CULTURE, URINE    URINALYSIS W/ REFLEX CULTURE    URINALYSIS W/ RFLX MICROSCOPIC    CBC WITH AUTOMATED DIFF    CMP    DOSS CATHETER, CHANGE    SALINE LOCK IV ONE TIME STAT    calcium polycarbophiL (FIBERCON) 625 mg tablet    CRANBERRY PO    peppermint oil (IBGARD PO)    lubiPROStone (Amitiza) 8 mcg capsule    meclizine (ANTIVERT) 12.5 mg tablet    sodium chloride (NS) flush 5-10 mL       MEDICATIONS GIVEN:    Medications   sodium chloride (NS) flush 5-10 mL (has no administration in time range)         Diagnosis     Clinical Impression:   1.  Urinary retention            Disposition       Orders Placed This Encounter    CULTURE, URINE    URINALYSIS W/ REFLEX CULTURE    URINALYSIS W/ RFLX MICROSCOPIC    CBC WITH AUTOMATED DIFF    CMP    DOSS CATHETER, CHANGE    SALINE LOCK IV ONE TIME STAT    calcium polycarbophiL (FIBERCON) 625 mg tablet    CRANBERRY PO    peppermint oil (IBGARD PO)    lubiPROStone (Amitiza) 8 mcg capsule    meclizine (ANTIVERT) 12.5 mg tablet    sodium chloride (NS) flush 5-10 mL         MEDICATIONS GIVEN:    Current Facility-Administered Medications   Medication Dose Route Frequency    sodium chloride (NS) flush 5-10 mL  5-10 mL IntraVENous ONCE     Current Outpatient Medications   Medication Sig    lubiPROStone (Amitiza) 8 mcg capsule     meclizine (ANTIVERT) 12.5 mg tablet     calcium polycarbophiL (FIBERCON) 625 mg tablet Take 625 mg by mouth.  CRANBERRY PO Take  by mouth.  peppermint oil (IBGARD PO) Take 180 mg by mouth daily.  dicyclomine (BENTYL) 20 mg tablet Take 1 Tablet by mouth every six (6) hours.  ondansetron (Zofran ODT) 4 mg disintegrating tablet Take 1 Tablet by mouth every eight (8) hours as needed for Nausea.  amLODIPine (NORVASC) 5 mg tablet TAKE 1 TABLET DAILY    metoprolol tartrate (LOPRESSOR) 25 mg tablet TAKE 1 TABLET BY MOUTH AS NEEDED (Patient not taking: Reported on 9/3/2021)    Bifidobacterium infantis (ALIGN PO) Take  by mouth.  cranberry fruit extract (CRANBERRY PO) Take  by mouth two (2) times a day.  metoprolol succinate (TOPROL-XL) 50 mg XL tablet Take 1 Tab by mouth two (2) times a day. (Patient taking differently: Take 50 mg by mouth daily.)    melatonin 3 mg tablet Take 1 Tab by mouth nightly. Takes for 3 weeks then stops a week before restarting    ipratropium (ATROVENT) 0.03 % nasal spray 2 Sprays daily. Indications: For Allergies (Patient not taking: Reported on 9/3/2021)    polyethylene glycol (MIRALAX) 17 gram packet Take 17 g by mouth as needed. Indications: Patient seldom takes it.  EPINEPHrine (EPIPEN) 0.3 mg/0.3 mL injection 0.3 mg by IntraMUSCular route once as needed. (Patient not taking: Reported on 9/3/2021)    multivitamin (ONE A DAY) tablet Take 1 Tab by mouth daily.     loratadine (CLARITIN) 10 mg tablet Take 10 mg by mouth daily.  rivaroxaban (XARELTO) 15 mg tab tablet Take  by mouth daily (with dinner).  atorvastatin (LIPITOR) 20 mg tablet Take 1 Tab by mouth daily. (Patient taking differently: Take 20 mg by mouth nightly.)    omeprazole (PRILOSEC) 20 mg capsule Take 40 mg by mouth daily. Takes 40mg a day    Cholecalciferol, Vitamin D3, (VITAMIN D3) 1,000 unit cap Take  by mouth daily. Taking 5000 units    ALPRAZolam (XANAX) 0.25 mg tablet Take 0.25 mg by mouth nightly.  CA CARBONATE/VITAMIN D3/VIT K (VIACTIV PO) Take  by mouth nightly.  levothyroxine (SYNTHROID) 25 mcg tablet Take 50 mcg by mouth Daily (before breakfast). Patient Vitals for the past 8 hrs:   Temp Pulse Resp BP SpO2   02/24/22 0300 -- (!) 55 16 (!) 122/58 99 %   02/24/22 0232 -- (!) 54 14 135/63 97 %   02/24/22 0141 -- 62 16 (!) 155/71 98 %   02/24/22 0107 98.1 °F (36.7 °C) 62 16 (!) 214/89 100 %       Medications   sodium chloride (NS) flush 5-10 mL (has no administration in time range)       Discharge note:    I have reviewed all pertinent and currently available diagnostic test results for this visit including, but not limited to, labs, xrays, and EKGs. I have reviewed all pertinent and currently available medical records. My plan of care and further evaluation and/or disposition is based on these results, as well as the initial, and subsequent, history and physical exam, as well as any additional complaints during the visit. Pt has been re-examined, appears to be stable states that they are feeling better and have no new complaints. Patient has nontoxic appearance and condition is stable for discharge. Laboratory tests, diagnosis, follow up plan and return instructions have been reviewed and discussed with the patient and/or family. Pt and/or family were instructed on symptoms that may arise after discharge requiring re-evaluation by a physician.  Pt and/or family have had the opportunity to ask questions about their care. Patient and/or family verbalized understanding and agreement with care plan, follow up and return instructions. Patient and/or family agree to return if their symptoms are not improving or immediately if they have any change in their condition. I have also put together some discharge instructions for the patient that include: 1) educational information regarding their diagnosis, 2) how to care for their diagnosis at home, as well a 3) list of reasons why they would want to return to the ED prior to their follow-up appointment, should their condition change as well as instructions to return to the ED should sx worsen at any time. Vital signs stable for discharge. Daniel Arizmendi MD      Disposition     Clinical Impression:     ICD-10-CM ICD-9-CM    1. Urinary retention  R33.9 788.20          PLAN:  1. Discharge home with daughter in stable condition  2. Discharge Medication List as of 2/24/2022  3:21 AM        3. Follow-up Information     Follow up With Specialties Details Why Contact Info    Manuel Neville MD Family Medicine Call in 1 day Follow up todays symptoms. [place] [address] [address]  [phone]          4. Discharged    Return to ED if worse    Please note, this dictation was completed with [employer], the computer voice recognition software. Quite often unanticipated grammatical, syntax, homophones, and other interpretive errors are inadvertently transcribed by the computer software. Please disregard these errors. Please excuse any errors that have escaped final proof reading.

## 2022-02-24 NOTE — ED NOTES
I have reviewed discharge instructions with the patient and daughter. The patient and daughter verbalized understanding. To car via BHAVESH Holden.

## 2022-02-24 NOTE — ED TRIAGE NOTES
Pt started having pain at catheter site since going to bed, denies pulling on tube. Has been in place for a month , urine thick with white sediment. Bladder distended.

## 2022-02-26 LAB
BACTERIA SPEC CULT: ABNORMAL
CC UR VC: ABNORMAL
SERVICE CMNT-IMP: ABNORMAL

## 2022-02-27 RX ORDER — CEPHALEXIN 500 MG/1
500 CAPSULE ORAL 3 TIMES DAILY
Qty: 21 CAPSULE | Refills: 0 | Status: SHIPPED | OUTPATIENT
Start: 2022-02-27 | End: 2022-03-06

## 2022-02-27 NOTE — PROGRESS NOTES
Call placed to pt regarding culture results. She uses [facility]; Alysia Todd. RX called. Pt states she cannot take Cipro. Given age will send over Keflex.   Emma Briceno MD

## 2022-03-08 NOTE — PROGRESS NOTES
Martinez Awad is a 80 y.o. female is here for routine f/u. Last seen 9/3/21. Hx Persistent/chronic rate-controlled AFib on anticoag, cerebrovascular disease, Hyperlipidemia, aortic insufficiency, and HTN. She reports occasionally she will feel palpitations. She has not had to take extra BB. She denies chest pain/ shortness of breath, orthopnea, PND, LE edema, syncope, presyncope or fatigue. Martinez Awad  is on [address] [place] [address] Road, reports no melena, hematuria, or obvious signs of bleeding. No falls. 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     Pure hypercholesterolemia     Mitral insufficiency     Hypothyroid       Lizeth Rincon MD (Inactive)  Past Medical History:   Diagnosis Date    Anxiety disorder     Aortic insufficiency     Atrial fibrillation (HCC)     Warfarin stopped by PCP for lung lesion    Benign hypertensive heart disease without heart failure     Carotid bruit 7/26/2012    Constipation     Essential hypertension     Heart disease     HTN (hypertension)     Hypothyroid     Memory disorder     Mesothelioma ([facility] [address]     Mitral insufficiency     Pure hypercholesterolemia     Snoring     Unspecified cerebral artery occlusion with cerebral infarction       Past Surgical History:   Procedure Laterality Date    ECHO 2D ADULT  2009    mild LV systolic dysfunction, normal chamber dimensions, mild aortic regurgitation and mild mitral regurgitation. The ejection fraction was 45-50%.     HX APPENDECTOMY      HX CATARACT REMOVAL      HX TONSILLECTOMY       Allergies   Allergen Reactions    Flagyl [Metronidazole] Nausea and Vomiting  Penicillins Nausea and Vomiting      Family History   Problem Relation Age of Onset    Stroke Mother     Stroke Father       Social History     Socioeconomic History    Marital status:      Spouse name: Not on file    Number of children: Not on file    Years of education: Not on file    Highest education level: Not on file   Occupational History    Not on file   Tobacco Use    Smoking status: Never Smoker    Smokeless tobacco: Never Used   Substance and Sexual Activity    Alcohol use: No    Drug use: No    Sexual activity: Not on file   Other Topics Concern    Not on file   Social History Narrative    Not on file     Social Determinants of Health     Financial Resource Strain:     Difficulty of Paying Living Expenses: Not on file   Food Insecurity:     Worried About [address] in the Last Year: Not on file    [place]av[place] of Food in the Last Year: Not on file   Transportation Needs:     Lack of Transportation (Medical): Not on file    Lack of Transportation (Non-Medical):  Not on file   Physical Activity:     Days of Exercise per Week: Not on file    Minutes of Exercise per Session: Not on file   Stress:     Feeling of Stress : Not on file   Social Connections:     Frequency of Communication with Friends and Family: Not on file    Frequency of Social Gatherings with Friends and Family: Not on file    Attends Yazidi Services: Not on file    Active Member of [address] [place] [address] [employer] or Organizations: Not on file    Attends Club or Organization Meetings: Not on file    Marital Status: Not on file   Intimate Partner Violence:     Fear of Current or Ex-Partner: Not on file    Emotionally Abused: Not on file    Physically Abused: Not on file    Sexually Abused: Not on file   Housing Stability:     Unable to Pay for Housing in the Last Year: Not on file    Number of [place] in the Last Year: Not on file    Unstable Housing in the Last Year: Not on file      Current Outpatient Medications   Medication Sig    ciprofloxacin HCl (CIPRO) 250 mg tablet Take 1 Tablet by mouth two (2) times a day.  calcium polycarbophiL (FIBERCON) 625 mg tablet Take 625 mg by mouth.  CRANBERRY PO Take  by mouth.  peppermint oil (IBGARD PO) Take 180 mg by mouth daily.  lubiPROStone (Amitiza) 8 mcg capsule Take 8 mcg by mouth daily (with breakfast).  amLODIPine (NORVASC) 5 mg tablet TAKE 1 TABLET DAILY    metoprolol tartrate (LOPRESSOR) 25 mg tablet TAKE 1 TABLET BY MOUTH AS NEEDED (Patient taking differently: Indications: if SBP >168.)    Bifidobacterium infantis (ALIGN PO) Take  by mouth.  metoprolol succinate (TOPROL-XL) 50 mg XL tablet Take 1 Tab by mouth two (2) times a day. (Patient taking differently: Take 50 mg by mouth daily.)    melatonin 3 mg tablet Take 1 Tab by mouth nightly. Takes for 3 weeks then stops a week before restarting    polyethylene glycol (MIRALAX) 17 gram packet Take 17 g by mouth as needed. Indications: Patient seldom takes it.  multivitamin (ONE A DAY) tablet Take 1 Tab by mouth daily.  loratadine (CLARITIN) 10 mg tablet Take 10 mg by mouth daily.  rivaroxaban (XARELTO) 15 mg tab tablet Take  by mouth daily (with dinner).  atorvastatin (LIPITOR) 20 mg tablet Take 1 Tab by mouth daily. (Patient taking differently: Take 20 mg by mouth nightly.)    omeprazole (PRILOSEC) 20 mg capsule Take 40 mg by mouth daily. Takes 40mg a day    Cholecalciferol, Vitamin D3, (VITAMIN D3) 1,000 unit cap Take  by mouth daily. Taking 5000 units    ALPRAZolam (XANAX) 0.25 mg tablet Take 0.25 mg by mouth nightly.  CA CARBONATE/VITAMIN D3/VIT K (VIACTIV PO) Take  by mouth nightly.  levothyroxine (SYNTHROID) 25 mcg tablet Take 50 mcg by mouth Daily (before breakfast).  meclizine (ANTIVERT) 12.5 mg tablet  (Patient not taking: Reported on 3/14/2022)    dicyclomine (BENTYL) 20 mg tablet Take 1 Tablet by mouth every six (6) hours.  (Patient not taking: Reported on 3/14/2022)    ondansetron (Zofran ODT) 4 mg disintegrating tablet Take 1 Tablet by mouth every eight (8) hours as needed for Nausea. (Patient not taking: Reported on 3/14/2022)    ipratropium (ATROVENT) 0.03 % nasal spray 2 Sprays daily. Indications: For Allergies (Patient not taking: Reported on 9/3/2021)    EPINEPHrine (EPIPEN) 0.3 mg/0.3 mL injection 0.3 mg by IntraMUSCular route once as needed. (Patient not taking: Reported on 9/3/2021)     No current facility-administered medications for this visit. Review of Symptoms:    CONST  No weight change. No fever, chills, sweats    ENT No visual changes, URI sx, sore throat    CV  See HPI   RESP  No cough, or sputum, wheezing. Also see HPI   GI  No abdominal pain or change in bowel habits. No heartburn or dysphagia. No melena or rectal bleeding.   No dysuria, urgency, frequency, hematuria   MSKEL  No joint pain, swelling. No muscle pain. SKIN  No rash or lesions. NEURO  No headache, syncope, or seizure. No weakness, loss of sensation, or paresthesias. PSYCH  No low mood or depression  No anxiety. HE/LYMPH  No easy bruising, abnormal bleeding, or enlarged glands. Physical ExamPhysical Exam:    Visit Vitals  /80 (BP 1 Location: Left upper arm, BP Patient Position: Sitting, BP Cuff Size: Small adult)   Pulse 60   Temp 98.6 °F (37 °C) (Temporal)   Resp 16   Ht 5' 3\" (1.6 m)   Wt 93 lb (42.2 kg)   SpO2 96% Comment: ra   BMI 16.47 kg/m²     Gen: NAD  HEENT:  PERRL, throat clear  Neck: no adenopathy, no thyromegaly, no JVD   Heart:  irregular,Nl S1S2,  II/VI murmur, no gallop or rub. Lungs:  clear  Abdomen:   Soft, non-tender, bowel sounds are active. Extremities:  No edema  Pulse: symmetric  Neuro: A&O times 3, No focal neuro deficits    Cardiographics    ECG: afib HR 53 no significant changes c/w ECG 9/3/21.       Labs:   Lab Results   Component Value Date/Time    Sodium 132 (L) 02/24/2022 01:55 AM    Sodium 137 11/06/2021 01:35 AM    Sodium 136 01/11/2020 05:25 PM    Sodium 139 07/16/2019 06:08 AM    Sodium 140 07/15/2019 05:40 AM    Potassium 3.9 02/24/2022 01:55 AM    Potassium 4.1 11/06/2021 01:35 AM    Potassium 4.1 01/11/2020 05:25 PM    Potassium 4.0 07/16/2019 06:08 AM    Potassium 4.0 07/15/2019 05:40 AM    Chloride 100 02/24/2022 01:55 AM    Chloride 100 11/06/2021 01:35 AM    Chloride 99 01/11/2020 05:25 PM    Chloride 101 07/16/2019 06:08 AM    Chloride 104 07/15/2019 05:40 AM    CO2 26 02/24/2022 01:55 AM    CO2 28 11/06/2021 01:35 AM    CO2 27 01/11/2020 05:25 PM    CO2 31 07/16/2019 06:08 AM    CO2 27 07/15/2019 05:40 AM    Anion gap 6 02/24/2022 01:55 AM    Anion gap 9 11/06/2021 01:35 AM    Anion gap 10 01/11/2020 05:25 PM    Anion gap 7 07/16/2019 06:08 AM    Anion gap 9 07/15/2019 05:40 AM    Glucose 96 02/24/2022 01:55 AM    Glucose 90 11/06/2021 01:35 AM    Glucose 95 01/11/2020 05:25 PM    Glucose 84 07/16/2019 06:08 AM    Glucose 85 07/15/2019 05:40 AM    BUN 22 (H) 02/24/2022 01:55 AM    BUN 19 11/06/2021 01:35 AM    BUN 20 01/11/2020 05:25 PM    BUN 14 07/16/2019 06:08 AM    BUN 19 07/15/2019 05:40 AM    Creatinine 0.71 02/24/2022 01:55 AM    Creatinine 0.74 11/06/2021 01:35 AM    Creatinine 0.82 01/11/2020 05:25 PM    Creatinine 0.84 07/16/2019 06:08 AM    Creatinine 0.80 07/15/2019 05:40 AM    BUN/Creatinine ratio 31 (H) 02/24/2022 01:55 AM    BUN/Creatinine ratio 26 (H) 11/06/2021 01:35 AM    BUN/Creatinine ratio 24 (H) 01/11/2020 05:25 PM    BUN/Creatinine ratio 17 07/16/2019 06:08 AM    BUN/Creatinine ratio 24 (H) 07/15/2019 05:40 AM    GFR est AA >60 02/24/2022 01:55 AM    GFR est AA >60 11/06/2021 01:35 AM    GFR est AA >60 01/11/2020 05:25 PM    GFR est AA >60 07/16/2019 06:08 AM    GFR est AA >60 07/15/2019 05:40 AM    GFR est non-AA >60 02/24/2022 01:55 AM    GFR est non-AA >60 11/06/2021 01:35 AM    GFR est non-AA >60 01/11/2020 05:25 PM    GFR est non-AA >60 07/16/2019 06:08 AM    GFR est non-AA >60 07/15/2019 05:40 AM    Calcium 9.2 02/24/2022 01:55 AM    Calcium 9.2 11/06/2021 01:35 AM    Calcium 9.3 01/11/2020 05:25 PM    Calcium 9.2 07/16/2019 06:08 AM    Calcium 9.0 07/15/2019 05:40 AM    Bilirubin, total 0.5 02/24/2022 01:55 AM    Bilirubin, total 0.5 11/06/2021 01:35 AM    Bilirubin, total 0.6 01/11/2020 05:25 PM    Bilirubin, total 0.4 07/14/2019 03:12 PM    Bilirubin, total 0.8 07/10/2019 04:15 PM    Alk. phosphatase 108 02/24/2022 01:55 AM    Alk. phosphatase 111 11/06/2021 01:35 AM    Alk. phosphatase 93 01/11/2020 05:25 PM    Alk. phosphatase 85 07/14/2019 03:12 PM    Alk.  phosphatase 97 07/10/2019 04:15 PM    Protein, total 7.9 02/24/2022 01:55 AM    Protein, total 8.6 (H) 11/06/2021 01:35 AM    Protein, total 8.0 01/11/2020 05:25 PM    Protein, total 7.3 07/14/2019 03:12 PM    Protein, total 8.4 (H) 07/10/2019 04:15 PM    Albumin 3.3 (L) 02/24/2022 01:55 AM    Albumin 3.9 11/06/2021 01:35 AM    Albumin 4.0 01/11/2020 05:25 PM    Albumin 3.5 07/14/2019 03:12 PM    Albumin 4.2 07/10/2019 04:15 PM    Globulin 4.6 (H) 02/24/2022 01:55 AM    Globulin 4.7 (H) 11/06/2021 01:35 AM    Globulin 4.0 01/11/2020 05:25 PM    Globulin 3.8 07/14/2019 03:12 PM    Globulin 4.2 (H) 07/10/2019 04:15 PM    A-G Ratio 0.7 (L) 02/24/2022 01:55 AM    A-G Ratio 0.8 (L) 11/06/2021 01:35 AM    A-G Ratio 1.0 (L) 01/11/2020 05:25 PM    A-G Ratio 0.9 (L) 07/14/2019 03:12 PM    A-G Ratio 1.0 (L) 07/10/2019 04:15 PM    ALT (SGPT) 33 02/24/2022 01:55 AM    ALT (SGPT) 36 11/06/2021 01:35 AM    ALT (SGPT) 25 01/11/2020 05:25 PM    ALT (SGPT) 23 07/14/2019 03:12 PM    ALT (SGPT) 28 07/10/2019 04:15 PM     Lab Results   Component Value Date/Time    CK 55 07/14/2019 03:12 PM     Lab Results   Component Value Date/Time    Cholesterol, total 134 07/15/2019 05:40 AM    HDL Cholesterol 50 07/15/2019 05:40 AM    LDL, calculated 72.4 07/15/2019 05:40 AM    Triglyceride 58 07/15/2019 05:40 AM    CHOL/HDL Ratio 2.7 07/15/2019 05:40 AM No results found for this or any previous visit. Assessment:         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     Pure hypercholesterolemia     Mitral insufficiency     Hypothyroid      Echo 7/15/19 with LVEF >70, severe LAE, mild AI, mild MR, normal RVSP.      Carotid dopplers 7/15/19 with mild bilat carotid plaque. 7/15/19 Head Ct/MRI with old chronic vasc changes/old infarct. Plan:   Denies adverse cardiac complaints. ECG rate controlled afib. Discussed should she experience worsening fatigue can consider decreasing BB. /80. Lipids and labs followed by PCP- 2/24/22 Creatinine 0.71, LFTS normal.      Continue current care and f/u in 6 months.     Marlena Montes De Oca NP

## 2022-03-14 ENCOUNTER — OFFICE VISIT (OUTPATIENT)
Dept: CARDIOLOGY CLINIC | Age: 87
End: 2022-03-14
Payer: MEDICARE

## 2022-03-14 VITALS
BODY MASS INDEX: 16.48 KG/M2 | DIASTOLIC BLOOD PRESSURE: 80 MMHG | HEIGHT: 63 IN | SYSTOLIC BLOOD PRESSURE: 120 MMHG | OXYGEN SATURATION: 96 % | WEIGHT: 93 LBS | RESPIRATION RATE: 16 BRPM | TEMPERATURE: 98.6 F | HEART RATE: 60 BPM

## 2022-03-14 DIAGNOSIS — E78.00 PURE HYPERCHOLESTEROLEMIA: ICD-10-CM

## 2022-03-14 DIAGNOSIS — I48.19 PERSISTENT ATRIAL FIBRILLATION (HCC): Primary | ICD-10-CM

## 2022-03-14 DIAGNOSIS — I34.0 MITRAL VALVE INSUFFICIENCY, UNSPECIFIED ETIOLOGY: ICD-10-CM

## 2022-03-14 DIAGNOSIS — I67.9 CVD (CEREBROVASCULAR DISEASE): ICD-10-CM

## 2022-03-14 DIAGNOSIS — I10 ESSENTIAL HYPERTENSION: ICD-10-CM

## 2022-03-14 DIAGNOSIS — I35.1 AORTIC VALVE INSUFFICIENCY, ETIOLOGY OF CARDIAC VALVE DISEASE UNSPECIFIED: ICD-10-CM

## 2022-03-14 PROCEDURE — G8432 DEP SCR NOT DOC, RNG: HCPCS | Performed by: NURSE PRACTITIONER

## 2022-03-14 PROCEDURE — 93000 ELECTROCARDIOGRAM COMPLETE: CPT | Performed by: NURSE PRACTITIONER

## 2022-03-14 PROCEDURE — G8419 CALC BMI OUT NRM PARAM NOF/U: HCPCS | Performed by: NURSE PRACTITIONER

## 2022-03-14 PROCEDURE — 99214 OFFICE O/P EST MOD 30 MIN: CPT | Performed by: NURSE PRACTITIONER

## 2022-03-14 PROCEDURE — 1101F PT FALLS ASSESS-DOCD LE1/YR: CPT | Performed by: NURSE PRACTITIONER

## 2022-03-14 PROCEDURE — G8427 DOCREV CUR MEDS BY ELIG CLIN: HCPCS | Performed by: NURSE PRACTITIONER

## 2022-03-14 PROCEDURE — 1090F PRES/ABSN URINE INCON ASSESS: CPT | Performed by: NURSE PRACTITIONER

## 2022-03-14 PROCEDURE — G8536 NO DOC ELDER MAL SCRN: HCPCS | Performed by: NURSE PRACTITIONER

## 2022-03-14 RX ORDER — CIPROFLOXACIN 250 MG/1
1 TABLET, FILM COATED ORAL 2 TIMES DAILY
COMMUNITY
Start: 2022-03-10 | End: 2022-05-26

## 2022-03-14 NOTE — PROGRESS NOTES
Identified pt with two pt identifiers(name and ). Reviewed record in preparation for visit and have obtained necessary documentation. Chief Complaint   Patient presents with    Irregular Heart Beat     6 month follow up    Hypertension      Vitals:    22 1122   BP: 120/80   Pulse: 60   Resp: 16   Temp: 98.6 °F (37 °C)   TempSrc: Temporal   SpO2: 96%   Weight: 93 lb (42.2 kg)   Height: 5' 3\" (1.6 m)   PainSc:   0 - No pain       Medications reviewed/approved by provider. Health Maintenance Review: Patient reminded of \"due or due soon\" health maintenance. I have asked the patient to contact his/her primary care provider (PCP) for follow-up on his/her health maintenance. Coordination of Care Questionnaire:  :   1) Have you been to an emergency room, urgent care, or hospitalized since your last visit? If yes, where when, and reason for visit? no       2. Have seen or consulted any other health care provider since your last visit? If yes, where when, and reason for visit? Yes, pcp md Segundo for routine/geriatric care. Patient is accompanied by self I have received verbal consent from Tamiko Chiu to discuss any/all medical information while they are present in the room.

## 2022-03-18 PROBLEM — I10 ESSENTIAL HYPERTENSION: Status: ACTIVE | Noted: 2018-03-20

## 2022-03-18 PROBLEM — I67.9 CVD (CEREBROVASCULAR DISEASE): Status: ACTIVE | Noted: 2019-07-14

## 2022-03-19 PROBLEM — R42 DIZZY: Status: ACTIVE | Noted: 2019-07-14

## 2022-03-19 PROBLEM — R33.9 URINE RETENTION: Status: ACTIVE | Noted: 2019-07-16

## 2022-03-19 PROBLEM — N39.0 UTI (URINARY TRACT INFECTION): Status: ACTIVE | Noted: 2019-07-14

## 2022-05-20 ENCOUNTER — TRANSCRIBE ORDER (OUTPATIENT)
Dept: REGISTRATION | Age: 87
End: 2022-05-20

## 2022-05-20 ENCOUNTER — HOSPITAL ENCOUNTER (OUTPATIENT)
Dept: GENERAL RADIOLOGY | Age: 87
Discharge: HOME | End: 2022-05-20
Payer: MEDICARE

## 2022-05-20 DIAGNOSIS — M79.89 SWELLING OF RIGHT THUMB: Primary | ICD-10-CM

## 2022-05-20 DIAGNOSIS — M79.89 SWELLING OF RIGHT THUMB: ICD-10-CM

## 2022-05-20 PROCEDURE — 73140 X-RAY EXAM OF FINGER(S): CPT

## 2022-05-26 ENCOUNTER — APPOINTMENT (OUTPATIENT)
Dept: CT IMAGING | Age: 87
End: 2022-05-26
Attending: FAMILY MEDICINE
Payer: MEDICARE

## 2022-05-26 ENCOUNTER — HOSPITAL ENCOUNTER (EMERGENCY)
Age: 87
Discharge: HOME | End: 2022-05-26
Attending: FAMILY MEDICINE
Payer: MEDICARE

## 2022-05-26 VITALS
TEMPERATURE: 97.9 F | DIASTOLIC BLOOD PRESSURE: 62 MMHG | HEIGHT: 63 IN | BODY MASS INDEX: 16.83 KG/M2 | SYSTOLIC BLOOD PRESSURE: 148 MMHG | OXYGEN SATURATION: 98 % | RESPIRATION RATE: 16 BRPM | HEART RATE: 62 BPM | WEIGHT: 95 LBS

## 2022-05-26 DIAGNOSIS — K59.00 CONSTIPATION, UNSPECIFIED CONSTIPATION TYPE: Primary | ICD-10-CM

## 2022-05-26 LAB
ALBUMIN SERPL-MCNC: 3.6 G/DL (ref 3.5–5)
ALBUMIN/GLOB SERPL: 0.8 {RATIO} (ref 1.1–2.2)
ALP SERPL-CCNC: 117 U/L (ref 45–117)
ALT SERPL-CCNC: 57 U/L (ref 12–78)
ANION GAP SERPL CALC-SCNC: 8 MMOL/L (ref 5–15)
APPEARANCE UR: CLEAR
AST SERPL-CCNC: 32 U/L (ref 15–37)
BACTERIA URNS QL MICRO: NEGATIVE /HPF
BASOPHILS # BLD: 0 K/UL (ref 0–0.1)
BASOPHILS NFR BLD: 0 % (ref 0–1)
BILIRUB SERPL-MCNC: 0.4 MG/DL (ref 0.2–1)
BILIRUB UR QL: NEGATIVE
BUN SERPL-MCNC: 21 MG/DL (ref 6–20)
BUN/CREAT SERPL: 29 (ref 12–20)
CALCIUM SERPL-MCNC: 9 MG/DL (ref 8.5–10.1)
CHLORIDE SERPL-SCNC: 97 MMOL/L (ref 97–108)
CO2 SERPL-SCNC: 26 MMOL/L (ref 21–32)
COLOR UR: ABNORMAL
CREAT SERPL-MCNC: 0.73 MG/DL (ref 0.55–1.02)
DIFFERENTIAL METHOD BLD: ABNORMAL
EOSINOPHIL # BLD: 0.1 K/UL (ref 0–0.4)
EOSINOPHIL NFR BLD: 1 % (ref 0–7)
EPITH CASTS URNS QL MICRO: ABNORMAL /LPF
ERYTHROCYTE [DISTWIDTH] IN BLOOD BY AUTOMATED COUNT: 15 % (ref 11.5–14.5)
GLOBULIN SER CALC-MCNC: 4.3 G/DL (ref 2–4)
GLUCOSE SERPL-MCNC: 90 MG/DL (ref 65–100)
GLUCOSE UR STRIP.AUTO-MCNC: NEGATIVE MG/DL
HCT VFR BLD AUTO: 33.6 % (ref 35–47)
HGB BLD-MCNC: 11.4 G/DL (ref 11.5–16)
HGB UR QL STRIP: ABNORMAL
IMM GRANULOCYTES # BLD AUTO: 0 K/UL (ref 0–0.04)
IMM GRANULOCYTES NFR BLD AUTO: 0 % (ref 0–0.5)
KETONES UR QL STRIP.AUTO: NEGATIVE MG/DL
LACTATE SERPL-SCNC: 0.5 MMOL/L (ref 0.4–2)
LEUKOCYTE ESTERASE UR QL STRIP.AUTO: NEGATIVE
LYMPHOCYTES # BLD: 1.6 K/UL (ref 0.8–3.5)
LYMPHOCYTES NFR BLD: 32 % (ref 12–49)
MCH RBC QN AUTO: 28.7 PG (ref 26–34)
MCHC RBC AUTO-ENTMCNC: 33.9 G/DL (ref 30–36.5)
MCV RBC AUTO: 84.6 FL (ref 80–99)
MONOCYTES # BLD: 0.4 K/UL (ref 0–1)
MONOCYTES NFR BLD: 8 % (ref 5–13)
NEUTS SEG # BLD: 3.1 K/UL (ref 1.8–8)
NEUTS SEG NFR BLD: 59 % (ref 32–75)
NITRITE UR QL STRIP.AUTO: NEGATIVE
NRBC # BLD: 0 K/UL (ref 0–0.01)
NRBC BLD-RTO: 0 PER 100 WBC
PH UR STRIP: 6 [PH] (ref 5–8)
PLATELET # BLD AUTO: 198 K/UL (ref 150–400)
PMV BLD AUTO: 10 FL (ref 8.9–12.9)
POTASSIUM SERPL-SCNC: 4 MMOL/L (ref 3.5–5.1)
PROT SERPL-MCNC: 7.9 G/DL (ref 6.4–8.2)
PROT UR STRIP-MCNC: NEGATIVE MG/DL
RBC # BLD AUTO: 3.97 M/UL (ref 3.8–5.2)
RBC #/AREA URNS HPF: ABNORMAL /HPF (ref 0–5)
SODIUM SERPL-SCNC: 131 MMOL/L (ref 136–145)
SP GR UR REFRACTOMETRY: 1 (ref 1–1.03)
UROBILINOGEN UR QL STRIP.AUTO: 0.2 EU/DL (ref 0.2–1)
WBC # BLD AUTO: 5.2 K/UL (ref 3.6–11)
WBC URNS QL MICRO: ABNORMAL /HPF (ref 0–4)

## 2022-05-26 PROCEDURE — 74011000636 HC RX REV CODE- 636: Performed by: FAMILY MEDICINE

## 2022-05-26 PROCEDURE — 99285 EMERGENCY DEPT VISIT HI MDM: CPT

## 2022-05-26 PROCEDURE — 74011250636 HC RX REV CODE- 250/636: Performed by: FAMILY MEDICINE

## 2022-05-26 PROCEDURE — 80053 COMPREHEN METABOLIC PANEL: CPT

## 2022-05-26 PROCEDURE — 83605 ASSAY OF LACTIC ACID: CPT

## 2022-05-26 PROCEDURE — 81001 URINALYSIS AUTO W/SCOPE: CPT

## 2022-05-26 PROCEDURE — 85025 COMPLETE CBC W/AUTO DIFF WBC: CPT

## 2022-05-26 PROCEDURE — 74177 CT ABD & PELVIS W/CONTRAST: CPT

## 2022-05-26 PROCEDURE — 36415 COLL VENOUS BLD VENIPUNCTURE: CPT

## 2022-05-26 PROCEDURE — 74011250637 HC RX REV CODE- 250/637: Performed by: FAMILY MEDICINE

## 2022-05-26 RX ORDER — MAGNESIUM CITRATE
296 SOLUTION, ORAL ORAL
Status: DISCONTINUED | OUTPATIENT
Start: 2022-05-26 | End: 2022-05-26

## 2022-05-26 RX ORDER — TRIAMCINOLONE ACETONIDE 1 MG/G
CREAM TOPICAL
COMMUNITY
Start: 2022-04-06

## 2022-05-26 RX ORDER — ACETAMINOPHEN 325 MG/1
650 TABLET ORAL ONCE
Status: COMPLETED | OUTPATIENT
Start: 2022-05-26 | End: 2022-05-26

## 2022-05-26 RX ORDER — SULFAMETHOXAZOLE AND TRIMETHOPRIM 800; 160 MG/1; MG/1
1 TABLET ORAL 2 TIMES DAILY
COMMUNITY
Start: 2022-05-20 | End: 2022-05-30

## 2022-05-26 RX ADMIN — IOPAMIDOL 95 ML: 612 INJECTION, SOLUTION INTRAVENOUS at 03:30

## 2022-05-26 RX ADMIN — SODIUM CHLORIDE 500 ML: 9 INJECTION, SOLUTION INTRAVENOUS at 02:12

## 2022-05-26 RX ADMIN — ACETAMINOPHEN 650 MG: 325 TABLET ORAL at 03:05

## 2022-05-26 NOTE — ED NOTES
IV removed. Assisted into personal clothing. Awaiting ride. Pt covered up with warm blankets. Cameron catheter without dependent loops, secured to bed, following freely.

## 2022-05-26 NOTE — ED PROVIDER NOTES
EMERGENCY DEPARTMENT HISTORY AND PHYSICAL EXAM          Date: 5/26/2022  Patient Name: Laurita Alvarez    History of Presenting Illness     Chief Complaint   Patient presents with    Abdominal Pain       History Provided By: Patient    HPI: Laurita Alvarez is a 80 y.o. female, pmhx listed below, who presents to the ED c/o mid abdominal pain that started around 9 pm last night, 2 hours pta. She has been taking SMZ/TMP for the last 5 days for a thumb infection. In the last two days, she has had a change in her stools; they have been smaller than usual. No vomiting or fevers. Nothing seems to make the pain worse or better. She has trouble describing the pain; it is constant in nature. She stopped taking Miralax because she thought it was too strong; she has been taking prunes for the last few weeks. PCP: Dorita Bledsoe MD (Inactive)    Allergies: NKDA  Social Hx: No tobacco, vaping, EtOH, Illicit Drugs; Lives locally    There are no other complaints, changes, or physical findings at this time. Current Outpatient Medications   Medication Sig Dispense Refill    trimethoprim-sulfamethoxazole (BACTRIM DS, SEPTRA DS) 160-800 mg per tablet Take 1 Tablet by mouth two (2) times a day.  triamcinolone acetonide (KENALOG) 0.1 % topical cream       calcium polycarbophiL (FIBERCON) 625 mg tablet Take 625 mg by mouth.  CRANBERRY PO Take  by mouth.  peppermint oil (IBGARD PO) Take 180 mg by mouth daily.  lubiPROStone (Amitiza) 8 mcg capsule Take 8 mcg by mouth daily (with breakfast).  meclizine (ANTIVERT) 12.5 mg tablet  (Patient not taking: Reported on 3/14/2022)      dicyclomine (BENTYL) 20 mg tablet Take 1 Tablet by mouth every six (6) hours. (Patient not taking: Reported on 3/14/2022) 20 Tablet 0    ondansetron (Zofran ODT) 4 mg disintegrating tablet Take 1 Tablet by mouth every eight (8) hours as needed for Nausea.  (Patient not taking: Reported on 3/14/2022) 10 Tablet 0    amLODIPine (NORVASC) 5 mg tablet TAKE 1 TABLET DAILY 90 Tablet 3    metoprolol tartrate (LOPRESSOR) 25 mg tablet TAKE 1 TABLET BY MOUTH AS NEEDED (Patient taking differently: Take 25 mg by mouth two (2) times daily as needed (Palpitations/ elevated HR or if BP >168). Indications: if SBP >168.) 30 Tab 1    Bifidobacterium infantis (ALIGN PO) Take  by mouth.  metoprolol succinate (TOPROL-XL) 50 mg XL tablet Take 1 Tab by mouth two (2) times a day. (Patient taking differently: Take 50 mg by mouth daily. ) 180 Tab 1    melatonin 3 mg tablet Take 1 Tab by mouth nightly. Takes for 3 weeks then stops a week before restarting      ipratropium (ATROVENT) 0.03 % nasal spray 2 Sprays daily. Indications: For Allergies (Patient not taking: Reported on 9/3/2021)      polyethylene glycol (MIRALAX) 17 gram packet Take 17 g by mouth as needed. Indications: Patient seldom takes it.  EPINEPHrine (EPIPEN) 0.3 mg/0.3 mL injection 0.3 mg by IntraMUSCular route once as needed. (Patient not taking: Reported on 9/3/2021)      multivitamin (ONE A DAY) tablet Take 1 Tab by mouth daily.  loratadine (CLARITIN) 10 mg tablet Take 10 mg by mouth daily.  rivaroxaban (XARELTO) 15 mg tab tablet Take  by mouth daily (with dinner).  atorvastatin (LIPITOR) 20 mg tablet Take 1 Tab by mouth daily. (Patient taking differently: Take 20 mg by mouth nightly.) 60 Tab 3    omeprazole (PRILOSEC) 20 mg capsule Take 40 mg by mouth daily. Takes 40mg a day      Cholecalciferol, Vitamin D3, (VITAMIN D3) 1,000 unit cap Take  by mouth daily. Taking 5000 units      ALPRAZolam (XANAX) 0.25 mg tablet Take 0.25 mg by mouth nightly.  CA CARBONATE/VITAMIN D3/VIT K (VIACTIV PO) Take  by mouth nightly.  levothyroxine (SYNTHROID) 25 mcg tablet Take 50 mcg by mouth Daily (before breakfast).          Past History     Past Medical History:  Past Medical History:   Diagnosis Date    Anxiety disorder     Aortic insufficiency     Atrial fibrillation ([facility] [address]     Warfarin stopped by PCP for lung lesion    Benign hypertensive heart disease without heart failure     Carotid bruit 7/26/2012    Constipation     Essential hypertension     Heart disease     HTN (hypertension)     Hypothyroid     Memory disorder     Mesothelioma ([facility] [address]     Mitral insufficiency     Pure hypercholesterolemia     Snoring     Unspecified cerebral artery occlusion with cerebral infarction        Past Surgical History:  Past Surgical History:   Procedure Laterality Date    ECHO 2D ADULT  2009    mild LV systolic dysfunction, normal chamber dimensions, mild aortic regurgitation and mild mitral regurgitation. The ejection fraction was 45-50%.  HX APPENDECTOMY      HX CATARACT REMOVAL      HX TONSILLECTOMY         Family History:  Family History   Problem Relation Age of Onset    Stroke Mother     Stroke Father        Social History:  Social History     Tobacco Use    Smoking status: Never Smoker    Smokeless tobacco: Never Used   Substance Use Topics    Alcohol use: No    Drug use: No       Allergies: Allergies   Allergen Reactions    Flagyl [Metronidazole] Nausea and Vomiting    Penicillins Nausea and Vomiting         Review of Systems   Review of Systems   Constitutional: Negative for chills and fever. HENT: Negative for congestion, rhinorrhea and sore throat. Respiratory: Negative for cough and shortness of breath. Cardiovascular: Negative for chest pain and leg swelling. Gastrointestinal: Positive for abdominal pain and constipation. Negative for diarrhea, nausea and vomiting. Genitourinary: Negative for dysuria and flank pain. Musculoskeletal: Negative for back pain and neck pain. Skin: Negative for rash. Neurological: Negative for weakness, light-headedness and headaches. Hematological: Does not bruise/bleed easily. Physical Exam   Physical Exam  Vitals reviewed. Constitutional:       General: She is not in acute distress. Appearance: She is well-developed. She is not diaphoretic. HENT:      Head: Normocephalic and atraumatic. Right Ear: External ear normal.      Left Ear: External ear normal.      Nose: Nose normal.   Eyes:      General: No scleral icterus. Right eye: No discharge. Left eye: No discharge. Conjunctiva/sclera: Conjunctivae normal.      Pupils: Pupils are equal, round, and reactive to light. Neck:      Thyroid: No thyromegaly. Trachea: No tracheal deviation. Cardiovascular:      Rate and Rhythm: Normal rate and regular rhythm. Heart sounds: Normal heart sounds. No murmur heard. No friction rub. No gallop. Pulmonary:      Effort: Pulmonary effort is normal. No respiratory distress. Breath sounds: Normal breath sounds. No wheezing or rales. Chest:      Chest wall: No tenderness. Abdominal:      General: Bowel sounds are normal. There is no distension. Palpations: Abdomen is soft. Tenderness: There is abdominal tenderness in the periumbilical area. There is no right CVA tenderness, left CVA tenderness, guarding or rebound. Hernia: No hernia is present. Genitourinary:     Rectum: Normal.      Comments: Moderate constipation but no large mass requiring disimpaction. Musculoskeletal:         General: No tenderness or deformity. Normal range of motion. Cervical back: Normal range of motion and neck supple. Skin:     General: Skin is warm and dry. Neurological:      Mental Status: She is alert and oriented to person, place, and time. Cranial Nerves: No cranial nerve deficit. Coordination: Coordination normal.      Deep Tendon Reflexes: Reflexes are normal and symmetric.  Reflexes normal.         Diagnostic Study Results     Labs -     Recent Results (from the past 12 hour(s))   CBC WITH AUTOMATED DIFF    Collection Time: 05/26/22  2:10 AM   Result Value Ref Range    WBC 5.2 3.6 - 11.0 K/uL    RBC 3.97 3.80 - 5.20 M/uL    HGB 11.4 (L) 11.5 - 16.0 g/dL    HCT 33.6 (L) 35.0 - 47.0 %    MCV 84.6 80.0 - 99.0 FL    MCH 28.7 26.0 - 34.0 PG    MCHC 33.9 30.0 - 36.5 g/dL    RDW 15.0 (H) 11.5 - 14.5 %    PLATELET 345 538 - 288 K/uL    MPV 10.0 8.9 - 12.9 FL    NRBC 0.0 0  WBC    ABSOLUTE NRBC 0.00 0.00 - 0.01 K/uL    NEUTROPHILS 59 32 - 75 %    LYMPHOCYTES 32 12 - 49 %    MONOCYTES 8 5 - 13 %    EOSINOPHILS 1 0 - 7 %    BASOPHILS 0 0 - 1 %    IMMATURE GRANULOCYTES 0 0.0 - 0.5 %    ABS. NEUTROPHILS 3.1 1.8 - 8.0 K/UL    ABS. LYMPHOCYTES 1.6 0.8 - 3.5 K/UL    ABS. MONOCYTES 0.4 0.0 - 1.0 K/UL    ABS. EOSINOPHILS 0.1 0.0 - 0.4 K/UL    ABS. BASOPHILS 0.0 0.0 - 0.1 K/UL    ABS. IMM. GRANS. 0.0 0.00 - 0.04 K/UL    DF AUTOMATED     METABOLIC PANEL, COMPREHENSIVE    Collection Time: 05/26/22  2:10 AM   Result Value Ref Range    Sodium 131 (L) 136 - 145 mmol/L    Potassium 4.0 3.5 - 5.1 mmol/L    Chloride 97 97 - 108 mmol/L    CO2 26 21 - 32 mmol/L    Anion gap 8 5 - 15 mmol/L    Glucose 90 65 - 100 mg/dL    BUN 21 (H) 6 - 20 MG/DL    Creatinine 0.73 0.55 - 1.02 MG/DL    BUN/Creatinine ratio 29 (H) 12 - 20      GFR est AA >60 >60 ml/min/1.73m2    GFR est non-AA >60 >60 ml/min/1.73m2    Calcium 9.0 8.5 - 10.1 MG/DL    Bilirubin, total 0.4 0.2 - 1.0 MG/DL    ALT (SGPT) 57 12 - 78 U/L    AST (SGOT) 32 15 - 37 U/L    Alk. phosphatase 117 45 - 117 U/L    Protein, total 7.9 6.4 - 8.2 g/dL    Albumin 3.6 3.5 - 5.0 g/dL    Globulin 4.3 (H) 2.0 - 4.0 g/dL    A-G Ratio 0.8 (L) 1.1 - 2.2     LACTIC ACID    Collection Time: 05/26/22  2:10 AM   Result Value Ref Range    Lactic acid 0.5 0.4 - 2.0 MMOL/L       Radiologic Studies -   CT ABD PELV W CONT   Final Result   No acute intraperitoneal process is identified. Please see above for additional nonemergent incidental findings. CT Results  (Last 48 hours)               05/26/22 0329  CT ABD PELV W CONT Final result    Impression:  No acute intraperitoneal process is identified.     Please see above for additional nonemergent incidental findings. Narrative:      CLINICAL HISTORY: abd pain, possible constipation   INDICATION: abd pain, possible constipation   COMPARISON: 2013. CONTRAST: 100  ml Isovue 370   TECHNIQUE:    Multislice helical CT was performed of the abdomen and pelvis following   uneventful rapid bolus intravenous contrast administration. Oral contrast was   not administered. Contiguous 5 mm axial images were reconstructed and lung and   soft tissue windows were generated. Coronal and sagittal reformations were   generated. CT dose reduction was achieved through use of a standardized   protocol tailored for this examination and automatic exposure control for dose   modulation. FINDINGS:   LUNG BASES:Emphysematous changes. Cardiomegaly. Coronary artery disease. Aortic   atherosclerotic change. LIVER: No mass or biliary dilatation. There is no intrahepatic duct dilatation. There is no hepatic parenchymal mass. Hepatic enhancement pattern is within   normal limits. Portal vein is patent. GALLBLADDER:  No dilatation or wall thickening. SPLEEN/PANCREAS: No mass. There is no pancreatic duct dilatation. There is no   evidence of splenomegaly. ADRENALS/KIDNEYS: No mass. There is no hydronephrosis. There is no renal mass. There is no perinephric mass. STOMACH: No dilatation or wall thickening. COLON AND SMALL BOWEL: Fecal stasis is severe. Prominent gonadal vein. There is   no free intraperitoneal air. There is no evidence of incarceration or   obstruction. No mesenteric adenopathy. PERITONEUM: Unremarkable. APPENDIX: Unremarkable. BLADDER/REPRODUCTIVE ORGANS: Cameron catheter in place. RETROPERITONEUM: Unremarkable. The abdominal aorta is normal in caliber. No   aneurysm. No retroperitoneal adenopathy. OSSEOUS STRUCTURES: Levoscoliosis. CXR Results  (Last 48 hours)    None            Medical Decision Making   I am the first provider for this patient.     I reviewed the vital signs, available nursing notes, past medical history, past surgical history, family history and social history. Vital Signs-Reviewed the patient's vital signs. Patient Vitals for the past 12 hrs:   Temp Pulse Resp BP SpO2   05/26/22 0200 -- -- -- -- 98 %   05/26/22 0145 97.9 °F (36.6 °C) (!) 59 16 (!) 164/76 97 %       Pulse Oximetry Analysis - 97% on RA    Cardiac Monitor:   Rate: 65 bpm  Rhythm: Normal Sinus Rhythm      Records Reviewed: Nursing Notes, Old Medical Records, Previous Radiology Studies and Previous Laboratory Studies    Provider Notes (Medical Decision Making):   MDM:  Elderly woman with 6 hours of abdominal pain. TMP vs Constipation vs Ischemia vs Diverticulitis    ED Course:   Initial assessment performed. The patients presenting problems have been discussed, and they are in agreement with the care plan formulated and outlined with them. I have encouraged them to ask questions as they arise throughout their visit. PROGRESS NOTE:  Given APAP 650 c some improvement in her pain. She was happy that the CT was negative except for the severe fecal stasis. She does have Miralax at home, so will take that when she gets home. Discharge note:  Pt re-evaluated and noted to be feeling better, ready for discharge. Updated pt on all final lab and CT findings. Will follow up as instructed. All questions have been answered, pt voiced understanding and agreement with plan. Specific return precautions provided as well as instructions to return to the ED should sx worsen at any time. Vital signs stable for discharge. Critical Care Time: 0      Diagnosis     Clinical Impression:   1. Constipation, unspecified constipation type        PLAN:  1. Miralax 17 grams daily x 7 then every other day    Current Discharge Medication List        2.    Follow-up Information     Follow up With Specialties Details Why Contact Info    Dorita Bledsoe MD Family Medicine In 1 week  [facility]  [address]  [address]  [phone]          Return to ED if worse     Disposition:  Home

## 2022-05-26 NOTE — ED TRIAGE NOTES
Pt has chronic retention with ledbetter intact(last changed 5/15) tonight after going to bed noted pain across lower abd, did not improve when she got up to walk. Had to small stools yesterday.  Ledbetter draining large amounts clear yellow urine

## 2022-07-20 RX ORDER — AMLODIPINE BESYLATE 5 MG/1
TABLET ORAL
Qty: 90 TABLET | Refills: 3 | Status: SHIPPED | OUTPATIENT
Start: 2022-07-20

## 2022-07-24 ENCOUNTER — HOSPITAL ENCOUNTER (EMERGENCY)
Age: 87
Discharge: HOME | End: 2022-07-24
Attending: EMERGENCY MEDICINE
Payer: MEDICARE

## 2022-07-24 VITALS
WEIGHT: 99 LBS | HEIGHT: 63 IN | TEMPERATURE: 97.7 F | BODY MASS INDEX: 17.54 KG/M2 | HEART RATE: 51 BPM | DIASTOLIC BLOOD PRESSURE: 69 MMHG | RESPIRATION RATE: 15 BRPM | SYSTOLIC BLOOD PRESSURE: 166 MMHG | OXYGEN SATURATION: 98 %

## 2022-07-24 DIAGNOSIS — R42 DIZZINESS: Primary | ICD-10-CM

## 2022-07-24 DIAGNOSIS — N39.0 URINARY TRACT INFECTION ASSOCIATED WITH INDWELLING URETHRAL CATHETER, INITIAL ENCOUNTER (HCC): ICD-10-CM

## 2022-07-24 DIAGNOSIS — T83.511A URINARY TRACT INFECTION ASSOCIATED WITH INDWELLING URETHRAL CATHETER, INITIAL ENCOUNTER (HCC): ICD-10-CM

## 2022-07-24 LAB
ALBUMIN SERPL-MCNC: 3.7 G/DL (ref 3.5–5)
ALBUMIN/GLOB SERPL: 0.8 {RATIO} (ref 1.1–2.2)
ALP SERPL-CCNC: 90 U/L (ref 45–117)
ALT SERPL-CCNC: 35 U/L (ref 12–78)
ANION GAP SERPL CALC-SCNC: 9 MMOL/L (ref 5–15)
APPEARANCE UR: CLEAR
AST SERPL-CCNC: 25 U/L (ref 15–37)
BACTERIA URNS QL MICRO: ABNORMAL /HPF
BASOPHILS # BLD: 0 K/UL (ref 0–0.1)
BASOPHILS NFR BLD: 0 % (ref 0–1)
BILIRUB SERPL-MCNC: 0.8 MG/DL (ref 0.2–1)
BILIRUB UR QL: NEGATIVE
BUN SERPL-MCNC: 15 MG/DL (ref 6–20)
BUN/CREAT SERPL: 22 (ref 12–20)
CALCIUM SERPL-MCNC: 9 MG/DL (ref 8.5–10.1)
CHLORIDE SERPL-SCNC: 103 MMOL/L (ref 97–108)
CO2 SERPL-SCNC: 28 MMOL/L (ref 21–32)
COLOR UR: ABNORMAL
CREAT SERPL-MCNC: 0.68 MG/DL (ref 0.55–1.02)
DIFFERENTIAL METHOD BLD: ABNORMAL
EOSINOPHIL # BLD: 0 K/UL (ref 0–0.4)
EOSINOPHIL NFR BLD: 0 % (ref 0–7)
EPITH CASTS URNS QL MICRO: ABNORMAL /LPF
ERYTHROCYTE [DISTWIDTH] IN BLOOD BY AUTOMATED COUNT: 15.2 % (ref 11.5–14.5)
GLOBULIN SER CALC-MCNC: 4.4 G/DL (ref 2–4)
GLUCOSE BLD STRIP.AUTO-MCNC: 61 MG/DL (ref 65–117)
GLUCOSE BLD STRIP.AUTO-MCNC: 85 MG/DL (ref 65–117)
GLUCOSE BLD STRIP.AUTO-MCNC: 99 MG/DL (ref 65–117)
GLUCOSE SERPL-MCNC: 134 MG/DL (ref 65–100)
GLUCOSE UR STRIP.AUTO-MCNC: NEGATIVE MG/DL
HCT VFR BLD AUTO: 38.5 % (ref 35–47)
HGB BLD-MCNC: 12.4 G/DL (ref 11.5–16)
HGB UR QL STRIP: ABNORMAL
IMM GRANULOCYTES # BLD AUTO: 0 K/UL (ref 0–0.04)
IMM GRANULOCYTES NFR BLD AUTO: 0 % (ref 0–0.5)
KETONES UR QL STRIP.AUTO: NEGATIVE MG/DL
LEUKOCYTE ESTERASE UR QL STRIP.AUTO: ABNORMAL
LYMPHOCYTES # BLD: 1.1 K/UL (ref 0.8–3.5)
LYMPHOCYTES NFR BLD: 23 % (ref 12–49)
MCH RBC QN AUTO: 28.9 PG (ref 26–34)
MCHC RBC AUTO-ENTMCNC: 32.2 G/DL (ref 30–36.5)
MCV RBC AUTO: 89.7 FL (ref 80–99)
MONOCYTES # BLD: 0.4 K/UL (ref 0–1)
MONOCYTES NFR BLD: 9 % (ref 5–13)
NEUTS SEG # BLD: 3.2 K/UL (ref 1.8–8)
NEUTS SEG NFR BLD: 68 % (ref 32–75)
NITRITE UR QL STRIP.AUTO: NEGATIVE
NRBC # BLD: 0 K/UL (ref 0–0.01)
NRBC BLD-RTO: 0 PER 100 WBC
PH UR STRIP: 5.5 [PH] (ref 5–8)
PLATELET # BLD AUTO: 176 K/UL (ref 150–400)
PMV BLD AUTO: 9.7 FL (ref 8.9–12.9)
POTASSIUM SERPL-SCNC: 3.8 MMOL/L (ref 3.5–5.1)
PROT SERPL-MCNC: 8.1 G/DL (ref 6.4–8.2)
PROT UR STRIP-MCNC: NEGATIVE MG/DL
RBC # BLD AUTO: 4.29 M/UL (ref 3.8–5.2)
RBC #/AREA URNS HPF: ABNORMAL /HPF (ref 0–5)
SERVICE CMNT-IMP: ABNORMAL
SERVICE CMNT-IMP: NORMAL
SERVICE CMNT-IMP: NORMAL
SODIUM SERPL-SCNC: 140 MMOL/L (ref 136–145)
SP GR UR REFRACTOMETRY: 1.01 (ref 1–1.03)
UA: UC IF INDICATED,UAUC: ABNORMAL
UROBILINOGEN UR QL STRIP.AUTO: 0.2 EU/DL (ref 0.2–1)
WBC # BLD AUTO: 4.7 K/UL (ref 3.6–11)
WBC URNS QL MICRO: ABNORMAL /HPF (ref 0–4)

## 2022-07-24 PROCEDURE — 80053 COMPREHEN METABOLIC PANEL: CPT

## 2022-07-24 PROCEDURE — 51702 INSERT TEMP BLADDER CATH: CPT

## 2022-07-24 PROCEDURE — 87077 CULTURE AEROBIC IDENTIFY: CPT

## 2022-07-24 PROCEDURE — 85025 COMPLETE CBC W/AUTO DIFF WBC: CPT

## 2022-07-24 PROCEDURE — 81001 URINALYSIS AUTO W/SCOPE: CPT

## 2022-07-24 PROCEDURE — 74011250637 HC RX REV CODE- 250/637: Performed by: EMERGENCY MEDICINE

## 2022-07-24 PROCEDURE — 87186 SC STD MICRODIL/AGAR DIL: CPT

## 2022-07-24 PROCEDURE — 36415 COLL VENOUS BLD VENIPUNCTURE: CPT

## 2022-07-24 PROCEDURE — 87086 URINE CULTURE/COLONY COUNT: CPT

## 2022-07-24 PROCEDURE — 99284 EMERGENCY DEPT VISIT MOD MDM: CPT

## 2022-07-24 PROCEDURE — 93005 ELECTROCARDIOGRAM TRACING: CPT

## 2022-07-24 PROCEDURE — 74011250636 HC RX REV CODE- 250/636: Performed by: EMERGENCY MEDICINE

## 2022-07-24 PROCEDURE — 82962 GLUCOSE BLOOD TEST: CPT

## 2022-07-24 RX ORDER — MECLIZINE HYDROCHLORIDE 25 MG/1
25 TABLET ORAL
Status: COMPLETED | OUTPATIENT
Start: 2022-07-24 | End: 2022-07-24

## 2022-07-24 RX ORDER — NITROFURANTOIN 25; 75 MG/1; MG/1
100 CAPSULE ORAL
Status: COMPLETED | OUTPATIENT
Start: 2022-07-24 | End: 2022-07-24

## 2022-07-24 RX ORDER — NITROFURANTOIN 25; 75 MG/1; MG/1
100 CAPSULE ORAL 2 TIMES DAILY
Qty: 14 CAPSULE | Refills: 0 | Status: SHIPPED | OUTPATIENT
Start: 2022-07-24 | End: 2022-07-31

## 2022-07-24 RX ADMIN — MECLIZINE HYDROCHLORIDE 25 MG: 25 TABLET ORAL at 11:27

## 2022-07-24 RX ADMIN — NITROFURANTOIN MONOHYDRATE/MACROCRYSTALLINE 100 MG: 25; 75 CAPSULE ORAL at 11:35

## 2022-07-24 RX ADMIN — SODIUM CHLORIDE 500 ML: 9 INJECTION, SOLUTION INTRAVENOUS at 11:32

## 2022-07-24 NOTE — ED NOTES
pts daughter Rich Fraire called [phone] and reports that pt has anxiety and that this pts other daughter is due back from [facility] ([facility] today and this patient always worries when her children travel and that the onset of this episode this AM may be due to anxiety.

## 2022-07-24 NOTE — ED PROVIDER NOTES
EMERGENCY DEPARTMENT HISTORY AND PHYSICAL EXAM      Date: 7/24/2022  Patient Name: Rory Herndon    History of Presenting Illness     Chief Complaint   Patient presents with    Dizziness       History Provided By: Patient and EMS    HPI: Rory Herndon, 80 y.o. female with PMHx significant for hypothyroidism, high cholesterol, A. fib, hypertension, presents via EMS to the ED with cc of dizziness. Patient reports she woke up this morning with some room spinning dizziness. She has had vertigo before so she took a meclizine around 7 AM.  Symptoms mostly improved. By the time of EMS arrival she was mostly asymptomatic and ambulated to the ambulance without difficulty. She denies any extremity weakness numbness or tingling. No vision changes. No problems with speech. She has a chronic indwelling Cameron. She has a history of A. fib but is not on any anticoagulation. Her symptoms are mostly resolved but she still states she feels a little \"off, and still little lightheaded. \"Cameron was last changed on Adriana 15. She denies any chest pain or shortness of breath. She denies any palpitations. PMHx: Significant for hypothyroid, high cholesterol, A. fib, hypertension  PSHx: Significant for appendectomy, cataract surgery  Social Hx: Non-smoker. There are no other complaints, changes, or physical findings at this time. PCP: Javier Carbone MD (Inactive)    No current facility-administered medications on file prior to encounter. Current Outpatient Medications on File Prior to Encounter   Medication Sig Dispense Refill    amLODIPine (NORVASC) 5 mg tablet TAKE 1 TABLET DAILY 90 Tablet 3    triamcinolone acetonide (KENALOG) 0.1 % topical cream       calcium polycarbophiL (FIBERCON) 625 mg tablet Take 625 mg by mouth. CRANBERRY PO Take  by mouth. peppermint oil (IBGARD PO) Take 180 mg by mouth daily. lubiPROStone (Amitiza) 8 mcg capsule Take 8 mcg by mouth daily (with breakfast).       meclizine (ANTIVERT) 12.5 mg tablet  (Patient not taking: Reported on 3/14/2022)      dicyclomine (BENTYL) 20 mg tablet Take 1 Tablet by mouth every six (6) hours. (Patient not taking: Reported on 3/14/2022) 20 Tablet 0    ondansetron (Zofran ODT) 4 mg disintegrating tablet Take 1 Tablet by mouth every eight (8) hours as needed for Nausea. (Patient not taking: Reported on 3/14/2022) 10 Tablet 0    metoprolol tartrate (LOPRESSOR) 25 mg tablet TAKE 1 TABLET BY MOUTH AS NEEDED (Patient taking differently: Take 25 mg by mouth two (2) times daily as needed (Palpitations/ elevated HR or if BP >168). Indications: if SBP >168.) 30 Tab 1    Bifidobacterium infantis (ALIGN PO) Take  by mouth.      metoprolol succinate (TOPROL-XL) 50 mg XL tablet Take 1 Tab by mouth two (2) times a day. (Patient taking differently: Take 50 mg by mouth daily. ) 180 Tab 1    melatonin 3 mg tablet Take 1 Tab by mouth nightly. Takes for 3 weeks then stops a week before restarting      ipratropium (ATROVENT) 0.03 % nasal spray 2 Sprays daily. Indications: For Allergies (Patient not taking: Reported on 9/3/2021)      polyethylene glycol (MIRALAX) 17 gram packet Take 17 g by mouth as needed. Indications: Patient seldom takes it. EPINEPHrine (EPIPEN) 0.3 mg/0.3 mL injection 0.3 mg by IntraMUSCular route once as needed. (Patient not taking: Reported on 9/3/2021)      multivitamin (ONE A DAY) tablet Take 1 Tab by mouth daily. loratadine (CLARITIN) 10 mg tablet Take 10 mg by mouth daily. rivaroxaban (XARELTO) 15 mg tab tablet Take  by mouth daily (with dinner). atorvastatin (LIPITOR) 20 mg tablet Take 1 Tab by mouth daily. (Patient taking differently: Take 20 mg by mouth nightly.) 60 Tab 3    omeprazole (PRILOSEC) 20 mg capsule Take 40 mg by mouth daily. Takes 40mg a day      Cholecalciferol, Vitamin D3, (VITAMIN D3) 1,000 unit cap Take  by mouth daily. Taking 5000 units      ALPRAZolam (XANAX) 0.25 mg tablet Take 0.25 mg by mouth nightly. CA CARBONATE/VITAMIN D3/VIT K (VIACTIV PO) Take  by mouth nightly. levothyroxine (SYNTHROID) 25 mcg tablet Take 50 mcg by mouth Daily (before breakfast). Past History     Past Medical History:  Past Medical History:   Diagnosis Date    Anxiety disorder     Aortic insufficiency     Atrial fibrillation (HCC)     Warfarin stopped by PCP for lung lesion    Benign hypertensive heart disease without heart failure     Carotid bruit 7/26/2012    Constipation     Essential hypertension     Heart disease     HTN (hypertension)     Hypothyroid     Memory disorder     Mesothelioma ([address]     Mitral insufficiency     Pure hypercholesterolemia     Snoring     Unspecified cerebral artery occlusion with cerebral infarction        Past Surgical History:  Past Surgical History:   Procedure Laterality Date    ECHO 2D ADULT  2009    mild LV systolic dysfunction, normal chamber dimensions, mild aortic regurgitation and mild mitral regurgitation. The ejection fraction was 45-50%. HX APPENDECTOMY      HX CATARACT REMOVAL      HX TONSILLECTOMY         Family History:  Family History   Problem Relation Age of Onset    Stroke Mother     Stroke Father        Social History:  Social History     Tobacco Use    Smoking status: Never    Smokeless tobacco: Never   Substance Use Topics    Alcohol use: No    Drug use: No       Allergies: Allergies   Allergen Reactions    Flagyl [Metronidazole] Nausea and Vomiting    Penicillins Nausea and Vomiting         Review of Systems   Review of Systems   Constitutional:  Negative for activity change, chills and fever. HENT:  Negative for congestion and sore throat. Eyes:  Negative for pain and redness. Respiratory:  Negative for cough, chest tightness and shortness of breath. Cardiovascular:  Negative for chest pain and palpitations. Gastrointestinal:  Negative for abdominal pain, diarrhea, nausea and vomiting. Genitourinary:  Negative for dysuria, frequency and urgency. Musculoskeletal:  Negative for back pain and neck pain. Skin:  Negative for rash. Neurological:  Positive for dizziness and light-headedness. Negative for syncope and headaches. Psychiatric/Behavioral:  Negative for confusion. All other systems reviewed and are negative. Physical Exam   Physical Exam  Vitals and nursing note reviewed. Constitutional:       General: She is not in acute distress. Appearance: She is well-developed. She is not diaphoretic. Comments: Calm and pleasant elderly white female   HENT:      Head: Normocephalic. Nose: Nose normal.      Mouth/Throat:      Pharynx: No oropharyngeal exudate. Eyes:      General: No scleral icterus. Conjunctiva/sclera: Conjunctivae normal.      Pupils: Pupils are equal, round, and reactive to light. Neck:      Thyroid: No thyromegaly. Vascular: No JVD. Trachea: No tracheal deviation. Cardiovascular:      Rate and Rhythm: Normal rate and regular rhythm. Heart sounds: No murmur heard. No friction rub. No gallop. Pulmonary:      Effort: Pulmonary effort is normal. No respiratory distress. Breath sounds: Normal breath sounds. No stridor. No wheezing or rales. Abdominal:      General: Bowel sounds are normal. There is no distension. Palpations: Abdomen is soft. Tenderness: There is no abdominal tenderness. There is no guarding or rebound. Musculoskeletal:         General: Normal range of motion. Cervical back: Normal range of motion and neck supple. Lymphadenopathy:      Cervical: No cervical adenopathy. Skin:     General: Skin is warm and dry. Findings: No erythema or rash. Neurological:      Mental Status: She is alert and oriented to person, place, and time. Cranial Nerves: No cranial nerve deficit. Motor: No abnormal muscle tone.       Coordination: Coordination normal.   Psychiatric:         Behavior: Behavior normal.           Diagnostic Study Results     Labs -     Recent Results (from the past 12 hour(s))   GLUCOSE, POC    Collection Time: 07/24/22  9:14 AM   Result Value Ref Range    Glucose (POC) 61 (L) 65 - 117 mg/dL    Performed by Sky Cain)    CBC WITH AUTOMATED DIFF    Collection Time: 07/24/22  9:40 AM   Result Value Ref Range    WBC 4.7 3.6 - 11.0 K/uL    RBC 4.29 3.80 - 5.20 M/uL    HGB 12.4 11.5 - 16.0 g/dL    HCT 38.5 35.0 - 47.0 %    MCV 89.7 80.0 - 99.0 FL    MCH 28.9 26.0 - 34.0 PG    MCHC 32.2 30.0 - 36.5 g/dL    RDW 15.2 (H) 11.5 - 14.5 %    PLATELET 332 700 - 361 K/uL    MPV 9.7 8.9 - 12.9 FL    NRBC 0.0 0  WBC    ABSOLUTE NRBC 0.00 0.00 - 0.01 K/uL    NEUTROPHILS 68 32 - 75 %    LYMPHOCYTES 23 12 - 49 %    MONOCYTES 9 5 - 13 %    EOSINOPHILS 0 0 - 7 %    BASOPHILS 0 0 - 1 %    IMMATURE GRANULOCYTES 0 0.0 - 0.5 %    ABS. NEUTROPHILS 3.2 1.8 - 8.0 K/UL    ABS. LYMPHOCYTES 1.1 0.8 - 3.5 K/UL    ABS. MONOCYTES 0.4 0.0 - 1.0 K/UL    ABS. EOSINOPHILS 0.0 0.0 - 0.4 K/UL    ABS. BASOPHILS 0.0 0.0 - 0.1 K/UL    ABS. IMM. GRANS. 0.0 0.00 - 0.04 K/UL    DF AUTOMATED     METABOLIC PANEL, COMPREHENSIVE    Collection Time: 07/24/22  9:40 AM   Result Value Ref Range    Sodium 140 136 - 145 mmol/L    Potassium 3.8 3.5 - 5.1 mmol/L    Chloride 103 97 - 108 mmol/L    CO2 28 21 - 32 mmol/L    Anion gap 9 5 - 15 mmol/L    Glucose 134 (H) 65 - 100 mg/dL    BUN 15 6 - 20 MG/DL    Creatinine 0.68 0.55 - 1.02 MG/DL    BUN/Creatinine ratio 22 (H) 12 - 20      GFR est AA >60 >60 ml/min/1.73m2    GFR est non-AA >60 >60 ml/min/1.73m2    Calcium 9.0 8.5 - 10.1 MG/DL    Bilirubin, total 0.8 0.2 - 1.0 MG/DL    ALT (SGPT) 35 12 - 78 U/L    AST (SGOT) 25 15 - 37 U/L    Alk.  phosphatase 90 45 - 117 U/L    Protein, total 8.1 6.4 - 8.2 g/dL    Albumin 3.7 3.5 - 5.0 g/dL    Globulin 4.4 (H) 2.0 - 4.0 g/dL    A-G Ratio 0.8 (L) 1.1 - 2.2     GLUCOSE, POC    Collection Time: 07/24/22 10:05 AM   Result Value Ref Range    Glucose (POC) 85 65 - 117 mg/dL    Performed by Sky Brown Aleyda(LPN)    URINALYSIS W/ REFLEX CULTURE    Collection Time: 07/24/22 10:42 AM    Specimen: Urine   Result Value Ref Range    Color YELLOW/STRAW      Appearance CLEAR CLEAR      Specific gravity 1.015 1.003 - 1.030      pH (UA) 5.5 5.0 - 8.0      Protein Negative NEG mg/dL    Glucose Negative NEG mg/dL    Ketone Negative NEG mg/dL    Bilirubin Negative NEG      Blood TRACE (A) NEG      Urobilinogen 0.2 0.2 - 1.0 EU/dL    Nitrites Negative NEG      Leukocyte Esterase TRACE (A) NEG      WBC 5-10 0 - 4 /hpf    RBC 5-10 0 - 5 /hpf    Epithelial cells MODERATE (A) FEW /lpf    Bacteria 1+ (A) NEG /hpf    UA:UC IF INDICATED CULTURE NOT INDICATED BY UA RESULT CNI         Radiologic Studies -   No orders to display     CT Results  (Last 48 hours)      None          CXR Results  (Last 48 hours)      None              Medical Decision Making   I am the first provider for this patient. I reviewed the vital signs, available nursing notes, past medical history, past surgical history, family history and social history. Vital Signs-Reviewed the patient's vital signs. Patient Vitals for the past 12 hrs:   Temp Pulse Resp BP SpO2   07/24/22 1050 -- (!) 57 14 -- 98 %   07/24/22 1006 -- 63 19 -- --   07/24/22 0951 -- 65 18 -- --   07/24/22 0921 -- (!) 58 17 -- 100 %   07/24/22 0908 97.7 °F (36.5 °C) (!) 56 18 (!) 173/86 100 %       Pulse Oximetry Analysis - 100% on RA    Cardiac Monitor:   Rate: 56 bpm  Rhythm: Junctional rhythm      ED EKG interpretation: 9:12 AM  Rhythm: Junctional rhythm; and regular . Rate (approx.): 56; Axis: normal; OH Interval: n/a; QRS interval: normal ; ST/T wave: non-specific changes; This EKG was interpreted by Mark Eid MD,ED Provider. Records Reviewed: Nursing Notes, Old Medical Records, and Ambulance Run Sheet    Provider Notes (Medical Decision Making):   DDx: Peripheral vertigo, metabolic abnormality/electrolyte abnormality, UTI. Dysrhythmia. A. fib.     ED Course:   Initial assessment performed. The patients presenting problems have been discussed, and they are in agreement with the care plan formulated and outlined with them. I have encouraged them to ask questions as they arise throughout their visit. PROGRESS NOTE    Pt reevaluated. Patient currently asymptomatic and feeling much better. Cameron catheter changed. Mild UTI. Patient has responded to Lucrezia Mercury in the past on chart review. Will discharge with Macrobid x7 days. She agreed to follow-up with her PCP this week. Written by Rizwana Carranza MD     Progress note:    Pt noted to be feeling better , ready for discharge. Updated pt and/or family on all final lab afindings. Will follow up as instructed. All questions have been answered, pt voiced understanding and agreement with plan. Abx were prescribed, pt advised that diarrhea and rash are possible side effects of the medications. Specific return precautions provided as well as instructions to return to the ED should sx worsen at any time. Vital signs stable for discharge. I have also put together some discharge instructions for them that include: 1) educational information regarding their diagnosis, 2) how to care for their diagnosis at home, as well a 3) list of reasons why they would want to return to the ED prior to their follow-up appointment, should their condition change. Written by Rizwana Carranza MD        Critical Care Time:   0    Disposition:  Discharge    PLAN:  1. Current Discharge Medication List        START taking these medications    Details   nitrofurantoin, macrocrystal-monohydrate, (Macrobid) 100 mg capsule Take 1 Capsule by mouth two (2) times a day for 7 days. Qty: 14 Capsule, Refills: 0  Start date: 7/24/2022, End date: 7/31/2022           2.    Follow-up Information       Follow up With Specialties Details Why Eleanor Ramirez MD [facility] Schedule an appointment as soon as possible for a visit in 1 week  [address] [place] [address]  [phone]      [address] [facility] Go in 1 day If symptoms worsen [place]  [phone]          Return to ED if worse     Diagnosis     Clinical Impression:   1. Dizziness    2. Urinary tract infection associated with indwelling urethral catheter, initial encounter [facility])              Please note that this dictation was completed with [employer], the computer voice recognition software. Quite often unanticipated grammatical, syntax, homophones, and other interpretive errors are inadvertently transcribed by the computer software. Please disregard these errors. Please excuse any errors that have escaped final proofreading.

## 2022-07-24 NOTE — ED TRIAGE NOTES
Woke this morning with dizziness before standing , took antivert that she had at bedside with relief but still feels lightheaded.  No drift, droop or slur,  equal . + indwelling ledbetter placed on 6/15/22, BSG 61 with PIV established PTA

## 2022-07-25 NOTE — PROGRESS NOTES
Diagnosed with UTI and sent on a prescription of Macrobid. Awaiting sensitivities.   Noni Merrill MD

## 2022-07-26 LAB
ATRIAL RATE: 53 BPM
BACTERIA SPEC CULT: ABNORMAL
BACTERIA SPEC CULT: ABNORMAL
CALCULATED R AXIS, ECG10: 22 DEGREES
CALCULATED T AXIS, ECG11: -58 DEGREES
CC UR VC: ABNORMAL
DIAGNOSIS, 93000: NORMAL
Q-T INTERVAL, ECG07: 464 MS
QRS DURATION, ECG06: 74 MS
QTC CALCULATION (BEZET), ECG08: 447 MS
SERVICE CMNT-IMP: ABNORMAL
VENTRICULAR RATE, ECG03: 56 BPM

## 2022-08-27 ENCOUNTER — HOSPITAL ENCOUNTER (EMERGENCY)
Age: 87
Discharge: HOME | End: 2022-08-27
Attending: EMERGENCY MEDICINE
Payer: MEDICARE

## 2022-08-27 ENCOUNTER — APPOINTMENT (OUTPATIENT)
Dept: CT IMAGING | Age: 87
End: 2022-08-27
Attending: EMERGENCY MEDICINE
Payer: MEDICARE

## 2022-08-27 VITALS
RESPIRATION RATE: 16 BRPM | DIASTOLIC BLOOD PRESSURE: 78 MMHG | WEIGHT: 95 LBS | SYSTOLIC BLOOD PRESSURE: 162 MMHG | BODY MASS INDEX: 16.83 KG/M2 | OXYGEN SATURATION: 98 % | HEIGHT: 63 IN | TEMPERATURE: 98 F | HEART RATE: 70 BPM

## 2022-08-27 DIAGNOSIS — K59.00 CONSTIPATION, UNSPECIFIED CONSTIPATION TYPE: Primary | ICD-10-CM

## 2022-08-27 DIAGNOSIS — K92.1 BLACK STOOL: ICD-10-CM

## 2022-08-27 LAB
ALBUMIN SERPL-MCNC: 3.8 G/DL (ref 3.5–5)
ALBUMIN/GLOB SERPL: 0.8 {RATIO} (ref 1.1–2.2)
ALP SERPL-CCNC: 107 U/L (ref 45–117)
ALT SERPL-CCNC: 41 U/L (ref 12–78)
ANION GAP SERPL CALC-SCNC: 11 MMOL/L (ref 5–15)
APTT PPP: 29 SEC (ref 22.1–31)
AST SERPL-CCNC: 26 U/L (ref 15–37)
BASOPHILS # BLD: 0 K/UL (ref 0–0.1)
BASOPHILS NFR BLD: 0 % (ref 0–1)
BILIRUB SERPL-MCNC: 0.7 MG/DL (ref 0.2–1)
BUN SERPL-MCNC: 16 MG/DL (ref 6–20)
BUN/CREAT SERPL: 22 (ref 12–20)
CALCIUM SERPL-MCNC: 9.2 MG/DL (ref 8.5–10.1)
CHLORIDE SERPL-SCNC: 96 MMOL/L (ref 97–108)
CO2 SERPL-SCNC: 26 MMOL/L (ref 21–32)
CREAT SERPL-MCNC: 0.72 MG/DL (ref 0.55–1.02)
DIFFERENTIAL METHOD BLD: ABNORMAL
EOSINOPHIL # BLD: 0.1 K/UL (ref 0–0.4)
EOSINOPHIL NFR BLD: 1 % (ref 0–7)
ERYTHROCYTE [DISTWIDTH] IN BLOOD BY AUTOMATED COUNT: 14.7 % (ref 11.5–14.5)
GLOBULIN SER CALC-MCNC: 4.5 G/DL (ref 2–4)
GLUCOSE SERPL-MCNC: 97 MG/DL (ref 65–100)
HCT VFR BLD AUTO: 35.4 % (ref 35–47)
HEMOCCULT STL QL: NEGATIVE
HGB BLD-MCNC: 11.7 G/DL (ref 11.5–16)
IMM GRANULOCYTES # BLD AUTO: 0 K/UL (ref 0–0.04)
IMM GRANULOCYTES NFR BLD AUTO: 0 % (ref 0–0.5)
INR PPP: 1.1 (ref 0.9–1.1)
LIPASE SERPL-CCNC: 205 U/L (ref 73–393)
LYMPHOCYTES # BLD: 1.2 K/UL (ref 0.8–3.5)
LYMPHOCYTES NFR BLD: 20 % (ref 12–49)
MCH RBC QN AUTO: 29.3 PG (ref 26–34)
MCHC RBC AUTO-ENTMCNC: 33.1 G/DL (ref 30–36.5)
MCV RBC AUTO: 88.5 FL (ref 80–99)
MONOCYTES # BLD: 0.6 K/UL (ref 0–1)
MONOCYTES NFR BLD: 9 % (ref 5–13)
NEUTS SEG # BLD: 4.3 K/UL (ref 1.8–8)
NEUTS SEG NFR BLD: 70 % (ref 32–75)
NRBC # BLD: 0 K/UL (ref 0–0.01)
NRBC BLD-RTO: 0 PER 100 WBC
PLATELET # BLD AUTO: 169 K/UL (ref 150–400)
PMV BLD AUTO: 9.8 FL (ref 8.9–12.9)
POTASSIUM SERPL-SCNC: 3.9 MMOL/L (ref 3.5–5.1)
PROT SERPL-MCNC: 8.3 G/DL (ref 6.4–8.2)
PROTHROMBIN TIME: 11.1 SEC (ref 9–11.1)
RBC # BLD AUTO: 4 M/UL (ref 3.8–5.2)
SODIUM SERPL-SCNC: 133 MMOL/L (ref 136–145)
THERAPEUTIC RANGE,PTTT: NORMAL SECS (ref 58–77)
WBC # BLD AUTO: 6.2 K/UL (ref 3.6–11)

## 2022-08-27 PROCEDURE — 36415 COLL VENOUS BLD VENIPUNCTURE: CPT

## 2022-08-27 PROCEDURE — 80053 COMPREHEN METABOLIC PANEL: CPT

## 2022-08-27 PROCEDURE — 99284 EMERGENCY DEPT VISIT MOD MDM: CPT

## 2022-08-27 PROCEDURE — 85610 PROTHROMBIN TIME: CPT

## 2022-08-27 PROCEDURE — 83690 ASSAY OF LIPASE: CPT

## 2022-08-27 PROCEDURE — 82272 OCCULT BLD FECES 1-3 TESTS: CPT

## 2022-08-27 PROCEDURE — 85025 COMPLETE CBC W/AUTO DIFF WBC: CPT

## 2022-08-27 PROCEDURE — 96374 THER/PROPH/DIAG INJ IV PUSH: CPT

## 2022-08-27 PROCEDURE — 74011250636 HC RX REV CODE- 250/636: Performed by: EMERGENCY MEDICINE

## 2022-08-27 PROCEDURE — C9113 INJ PANTOPRAZOLE SODIUM, VIA: HCPCS | Performed by: EMERGENCY MEDICINE

## 2022-08-27 PROCEDURE — 96375 TX/PRO/DX INJ NEW DRUG ADDON: CPT

## 2022-08-27 PROCEDURE — 85730 THROMBOPLASTIN TIME PARTIAL: CPT

## 2022-08-27 PROCEDURE — 74011000250 HC RX REV CODE- 250: Performed by: EMERGENCY MEDICINE

## 2022-08-27 PROCEDURE — 74176 CT ABD & PELVIS W/O CONTRAST: CPT

## 2022-08-27 RX ORDER — ONDANSETRON 2 MG/ML
4 INJECTION INTRAMUSCULAR; INTRAVENOUS
Status: COMPLETED | OUTPATIENT
Start: 2022-08-27 | End: 2022-08-27

## 2022-08-27 RX ORDER — SODIUM CHLORIDE 0.9 % (FLUSH) 0.9 %
5-40 SYRINGE (ML) INJECTION AS NEEDED
Status: DISCONTINUED | OUTPATIENT
Start: 2022-08-27 | End: 2022-08-27 | Stop reason: HOSPADM

## 2022-08-27 RX ORDER — SODIUM CHLORIDE 0.9 % (FLUSH) 0.9 %
5-40 SYRINGE (ML) INJECTION EVERY 8 HOURS
Status: DISCONTINUED | OUTPATIENT
Start: 2022-08-27 | End: 2022-08-27 | Stop reason: HOSPADM

## 2022-08-27 RX ORDER — PANTOPRAZOLE SODIUM 40 MG/10ML
40 INJECTION, POWDER, LYOPHILIZED, FOR SOLUTION INTRAVENOUS
Status: DISCONTINUED | OUTPATIENT
Start: 2022-08-27 | End: 2022-08-27

## 2022-08-27 RX ADMIN — SODIUM CHLORIDE 40 MG: 9 INJECTION, SOLUTION INTRAMUSCULAR; INTRAVENOUS; SUBCUTANEOUS at 17:31

## 2022-08-27 RX ADMIN — ONDANSETRON 4 MG: 2 INJECTION INTRAMUSCULAR; INTRAVENOUS at 17:29

## 2022-08-27 RX ADMIN — SODIUM CHLORIDE 500 ML: 9 INJECTION, SOLUTION INTRAVENOUS at 17:28

## 2022-08-27 NOTE — DISCHARGE INSTRUCTIONS
Tomorrow would recommend 3 scoops of Miralax in 32 ounces anddrink over one hr if not having a BM by mid-afternoon. Would recommend one additional scoop. Make sure you are drinking plent of water ~64ounce a day to stay wekk hydrated as dehydration can result in abdominal cramping and/or constipation issues.

## 2022-08-27 NOTE — ED TRIAGE NOTES
Pt presents via ambulatory for loose black stools x 4 days. Pt states prior to the black stools, she was constipated for a few days. Pt also reports nausea and abd discomfort.

## 2022-08-27 NOTE — ED NOTES
Bedside shift change report given to nallely RN (oncoming nurse) by Darci Power RN (offgoing nurse). Report included the following information SBAR, Kardex and ED Summary.

## 2022-08-28 NOTE — ED PROVIDER NOTES
EMERGENCY DEPARTMENT HISTORY AND PHYSICAL EXAM      Date: 8/27/2022  Patient Name: Kyra Sexton    History of Presenting Illness     Chief Complaint   Patient presents with    Melena       History Provided By: Patient    HPI: Kyra Sexton, 80 y.o. female presents to the ED with cc of left lower quadrant abdominal pain. Patient states that she has a history of diverticulosis. She states that she has been constipated and had used some MiraLAX and prune juice which had resulted in large bowel movement. She states despite having a bowel movement she has continued to have left lower quadrant abdominal pain moderate in severity worsened with palpation. She states in addition to this she began to note black stool this morning. She denies any prior history of GI bleed. She does note that she had taken Pepto-Bismol approximately 5 days ago for some abdominal pain and discomfort. She denies any fever or chills. She denies any chest pain or shortness of breath. She denies any nausea or vomiting. She denies any pain in the upper abdomen. She states that her pain is cramping in nature and intermittent as well. She denies any urinary symptoms she does have an indwelling Cameron catheter for chronic urinary retention. This does get changed out regularly and she has not noticed any change in color of malodorous urine. There are no other complaints, changes, or physical findings at this time. PCP: Edy Goldman MD (Inactive)    No current facility-administered medications on file prior to encounter. Current Outpatient Medications on File Prior to Encounter   Medication Sig Dispense Refill    amLODIPine (NORVASC) 5 mg tablet TAKE 1 TABLET DAILY 90 Tablet 3    triamcinolone acetonide (KENALOG) 0.1 % topical cream       calcium polycarbophiL (FIBERCON) 625 mg tablet Take 625 mg by mouth. CRANBERRY PO Take  by mouth. peppermint oil (IBGARD PO) Take 180 mg by mouth daily.       lubiPROStone (Amitiza) 8 mcg capsule Take 8 mcg by mouth daily (with breakfast). meclizine (ANTIVERT) 12.5 mg tablet  (Patient not taking: Reported on 3/14/2022)      dicyclomine (BENTYL) 20 mg tablet Take 1 Tablet by mouth every six (6) hours. (Patient not taking: Reported on 3/14/2022) 20 Tablet 0    ondansetron (Zofran ODT) 4 mg disintegrating tablet Take 1 Tablet by mouth every eight (8) hours as needed for Nausea. (Patient not taking: Reported on 3/14/2022) 10 Tablet 0    metoprolol tartrate (LOPRESSOR) 25 mg tablet TAKE 1 TABLET BY MOUTH AS NEEDED (Patient taking differently: Take 25 mg by mouth two (2) times daily as needed (Palpitations/ elevated HR or if BP >168). Indications: if SBP >168.) 30 Tab 1    Bifidobacterium infantis (ALIGN PO) Take  by mouth.      metoprolol succinate (TOPROL-XL) 50 mg XL tablet Take 1 Tab by mouth two (2) times a day. (Patient taking differently: Take 50 mg by mouth daily. ) 180 Tab 1    melatonin 3 mg tablet Take 1 Tab by mouth nightly. Takes for 3 weeks then stops a week before restarting      ipratropium (ATROVENT) 0.03 % nasal spray 2 Sprays daily. Indications: For Allergies (Patient not taking: Reported on 9/3/2021)      polyethylene glycol (MIRALAX) 17 gram packet Take 17 g by mouth as needed. Indications: Patient seldom takes it. EPINEPHrine (EPIPEN) 0.3 mg/0.3 mL injection 0.3 mg by IntraMUSCular route once as needed. (Patient not taking: Reported on 9/3/2021)      multivitamin (ONE A DAY) tablet Take 1 Tab by mouth daily. loratadine (CLARITIN) 10 mg tablet Take 10 mg by mouth daily. rivaroxaban (XARELTO) 15 mg tab tablet Take  by mouth daily (with dinner). atorvastatin (LIPITOR) 20 mg tablet Take 1 Tab by mouth daily. (Patient taking differently: Take 20 mg by mouth nightly.) 60 Tab 3    omeprazole (PRILOSEC) 20 mg capsule Take 40 mg by mouth daily. Takes 40mg a day      Cholecalciferol, Vitamin D3, (VITAMIN D3) 1,000 unit cap Take  by mouth daily.  Taking 5000 units ALPRAZolam (XANAX) 0.25 mg tablet Take 0.25 mg by mouth nightly. CA CARBONATE/VITAMIN D3/VIT K (VIACTIV PO) Take  by mouth nightly. levothyroxine (SYNTHROID) 25 mcg tablet Take 50 mcg by mouth Daily (before breakfast). Past History     Past Medical History:  Past Medical History:   Diagnosis Date    Anxiety disorder     Aortic insufficiency     Atrial fibrillation (HCC)     Warfarin stopped by PCP for lung lesion    Benign hypertensive heart disease without heart failure     Carotid bruit 7/26/2012    Constipation     Essential hypertension     Heart disease     HTN (hypertension)     Hypothyroid     Memory disorder     Mesothelioma ([address]     Mitral insufficiency     Pure hypercholesterolemia     Snoring     Unspecified cerebral artery occlusion with cerebral infarction        Past Surgical History:  Past Surgical History:   Procedure Laterality Date    ECHO 2D ADULT  2009    mild LV systolic dysfunction, normal chamber dimensions, mild aortic regurgitation and mild mitral regurgitation. The ejection fraction was 45-50%. HX APPENDECTOMY      HX CATARACT REMOVAL      HX TONSILLECTOMY         Family History:  Family History   Problem Relation Age of Onset    Stroke Mother     Stroke Father        Social History:  Social History     Tobacco Use    Smoking status: Never    Smokeless tobacco: Never   Substance Use Topics    Alcohol use: No    Drug use: No       Allergies: Allergies   Allergen Reactions    Flagyl [Metronidazole] Nausea and Vomiting    Penicillins Nausea and Vomiting         Review of Systems   Review of Systems   Constitutional: Negative. Negative for appetite change, chills, fatigue and fever. HENT: Negative. Negative for congestion, rhinorrhea, sinus pressure and sore throat. Eyes: Negative. Respiratory: Negative. Negative for cough, choking, chest tightness, shortness of breath and wheezing. Cardiovascular: Negative.   Negative for chest pain, palpitations and leg swelling. Gastrointestinal:  Positive for abdominal distention, abdominal pain, blood in stool (Black stool) and constipation. Negative for diarrhea, nausea and vomiting. Endocrine: Negative. Genitourinary: Negative. Negative for difficulty urinating, dysuria, flank pain and urgency. Musculoskeletal: Negative. Skin: Negative. Neurological: Negative. Negative for dizziness, speech difficulty, weakness, light-headedness, numbness and headaches. Psychiatric/Behavioral: Negative. All other systems reviewed and are negative. Physical Exam   Physical Exam  Vitals and nursing note reviewed. Constitutional:       General: She is not in acute distress. Appearance: She is well-developed. She is not diaphoretic. HENT:      Head: Normocephalic and atraumatic. Mouth/Throat:      Mouth: Mucous membranes are moist.      Pharynx: No oropharyngeal exudate. Eyes:      Conjunctiva/sclera: Conjunctivae normal.      Pupils: Pupils are equal, round, and reactive to light. Neck:      Vascular: No JVD. Trachea: No tracheal deviation. Cardiovascular:      Rate and Rhythm: Normal rate and regular rhythm. Heart sounds: Normal heart sounds. No murmur heard. Pulmonary:      Effort: Pulmonary effort is normal. No respiratory distress. Breath sounds: Normal breath sounds. No stridor. No wheezing or rales. Abdominal:      General: There is distension. Palpations: Abdomen is soft. Tenderness: There is abdominal tenderness (LLQ). There is no guarding or rebound. Comments: Firmness in left mid abdomen, BS present      Genitourinary:     Comments: Indwelling ledbetter cath, normal urine color no sediment    Musculoskeletal:         General: No tenderness. Normal range of motion. Cervical back: Normal range of motion and neck supple. Skin:     General: Skin is warm and dry. Capillary Refill: Capillary refill takes less than 2 seconds.    Neurological:      Mental Status: She is alert and oriented to person, place, and time. Cranial Nerves: No cranial nerve deficit. Comments: No gross motor or sensory deficits    Psychiatric:         Mood and Affect: Mood normal.         Behavior: Behavior normal.       Diagnostic Study Results     Labs -     Recent Results (from the past 12 hour(s))   CBC WITH AUTOMATED DIFF    Collection Time: 08/27/22  5:00 PM   Result Value Ref Range    WBC 6.2 3.6 - 11.0 K/uL    RBC 4.00 3.80 - 5.20 M/uL    HGB 11.7 11.5 - 16.0 g/dL    HCT 35.4 35.0 - 47.0 %    MCV 88.5 80.0 - 99.0 FL    MCH 29.3 26.0 - 34.0 PG    MCHC 33.1 30.0 - 36.5 g/dL    RDW 14.7 (H) 11.5 - 14.5 %    PLATELET 879 540 - 429 K/uL    MPV 9.8 8.9 - 12.9 FL    NRBC 0.0 0  WBC    ABSOLUTE NRBC 0.00 0.00 - 0.01 K/uL    NEUTROPHILS 70 32 - 75 %    LYMPHOCYTES 20 12 - 49 %    MONOCYTES 9 5 - 13 %    EOSINOPHILS 1 0 - 7 %    BASOPHILS 0 0 - 1 %    IMMATURE GRANULOCYTES 0 0.0 - 0.5 %    ABS. NEUTROPHILS 4.3 1.8 - 8.0 K/UL    ABS. LYMPHOCYTES 1.2 0.8 - 3.5 K/UL    ABS. MONOCYTES 0.6 0.0 - 1.0 K/UL    ABS. EOSINOPHILS 0.1 0.0 - 0.4 K/UL    ABS. BASOPHILS 0.0 0.0 - 0.1 K/UL    ABS. IMM. GRANS. 0.0 0.00 - 0.04 K/UL    DF AUTOMATED     METABOLIC PANEL, COMPREHENSIVE    Collection Time: 08/27/22  5:00 PM   Result Value Ref Range    Sodium 133 (L) 136 - 145 mmol/L    Potassium 3.9 3.5 - 5.1 mmol/L    Chloride 96 (L) 97 - 108 mmol/L    CO2 26 21 - 32 mmol/L    Anion gap 11 5 - 15 mmol/L    Glucose 97 65 - 100 mg/dL    BUN 16 6 - 20 MG/DL    Creatinine 0.72 0.55 - 1.02 MG/DL    BUN/Creatinine ratio 22 (H) 12 - 20      GFR est AA >60 >60 ml/min/1.73m2    GFR est non-AA >60 >60 ml/min/1.73m2    Calcium 9.2 8.5 - 10.1 MG/DL    Bilirubin, total 0.7 0.2 - 1.0 MG/DL    ALT (SGPT) 41 12 - 78 U/L    AST (SGOT) 26 15 - 37 U/L    Alk.  phosphatase 107 45 - 117 U/L    Protein, total 8.3 (H) 6.4 - 8.2 g/dL    Albumin 3.8 3.5 - 5.0 g/dL    Globulin 4.5 (H) 2.0 - 4.0 g/dL    A-G Ratio 0.8 (L) 1.1 - 2. 2     LIPASE    Collection Time: 08/27/22  5:00 PM   Result Value Ref Range    Lipase 205 73 - 393 U/L   PROTHROMBIN TIME + INR    Collection Time: 08/27/22  5:00 PM   Result Value Ref Range    INR 1.1 0.9 - 1.1      Prothrombin time 11.1 9.0 - 11.1 sec   PTT    Collection Time: 08/27/22  5:00 PM   Result Value Ref Range    aPTT 29.0 22.1 - 31.0 sec    aPTT, therapeutic range     58.0 - 77.0 SECS   OCCULT BLOOD, STOOL    Collection Time: 08/27/22  5:05 PM   Result Value Ref Range    Occult blood, stool Negative NEG         Radiologic Studies -   CT ABD PELV WO CONT   Final Result   Significant fecal stasis. Severe cardiomegaly. No ascites or colitis        CT Results  (Last 48 hours)                 08/27/22 1706  CT ABD PELV WO CONT Final result    Impression:  Significant fecal stasis. Severe cardiomegaly. No ascites or colitis       Narrative:  EXAM: CT ABD PELV WO CONT       INDICATION: Abdominal pain, constipation, dark stool       COMPARISON: May 26, 2022       IV CONTRAST: None. ORAL CONTRAST: None       TECHNIQUE:    Thin axial images were obtained through the abdomen and pelvis. Coronal and   sagittal reformats were generated. CT dose reduction was achieved through use of   a standardized protocol tailored for this examination and automatic exposure   control for dose modulation. The absence of intravenous contrast material reduces the sensitivity for   evaluation of the vasculature and solid organs. FINDINGS:    LOWER THORAX: Scarring at the lung bases. Severe cardiomegaly. LIVER: No mass. BILIARY TREE: Gallbladder is within normal limits. CBD is not dilated. SPLEEN: within normal limits. PANCREAS: No focal abnormality. ADRENALS: Unremarkable. KIDNEYS/URETERS: No calculus or hydronephrosis. STOMACH: Unremarkable. SMALL BOWEL: No dilatation or wall thickening. COLON: Significant ascites. No evidence of colitis.    APPENDIX: Nonvisualized   PERITONEUM: No ascites or pneumoperitoneum. RETROPERITONEUM: No lymphadenopathy or aortic aneurysm. REPRODUCTIVE ORGANS: Calcified fibroids in the uterus. URINARY BLADDER: Decompressed bladder containing a Cameron catheter. BONES: Degenerative changes of the lumbar spine and hips. ABDOMINAL WALL: No mass or hernia. ADDITIONAL COMMENTS: N/A                 CXR Results  (Last 48 hours)      None            Medical Decision Making   I am the first provider for this patient. I reviewed the vital signs, available nursing notes, past medical history, past surgical history, family history and social history. Vital Signs-Reviewed the patient's vital signs. Patient Vitals for the past 12 hrs:   Temp Pulse Resp BP SpO2   08/27/22 1940 -- -- 16 (!) 162/78 98 %   08/27/22 1907 -- 70 16 (!) 155/68 98 %   08/27/22 1830 -- 68 16 (!) 176/78 96 %   08/27/22 1800 -- 68 16 (!) 166/70 95 %   08/27/22 1727 -- 68 16 (!) 172/74 97 %   08/27/22 1634 98 °F (36.7 °C) 79 17 (!) 191/86 99 %       Records Reviewed: Nursing Notes, Old Medical Records, Ambulance Run Sheet, Previous Radiology Studies, and Previous Laboratory Studies  Pt with prior admission in 2018 for diverticulitis, had follow-up with Dr. Doreen Montalvo.     Provider Notes (Medical Decision Making):   DDx- UGI bleed, constipation diverticulitis, colitis, effect pf medication    ED Course:   Initial assessment performed. The patients presenting problems have been discussed, and they are in agreement with the care plan formulated and outlined with them. I have encouraged them to ask questions as they arise throughout their visit. Stool dark brown white-black in color we will send to the lab for testing. .    Discussed with the patient and her daughter lab and CT results. Patient's occult stool negative for blood. CT shows a significant amount of stool burden. Patient was given a soapsuds enema in the emergency department with some bowel movement.   Discussed with the patient and her daughter given the amount of stool burden on CT it is likely that she still needs to have a significant bowel movement(s). Discussed use of MiraLAX in the morning 3-4 scoops in 32 ounces and then repeat 1 scoop in the afternoon may repeat the following day if she has not had a significant amount of stool output. Also discussed with patient to increase water intake as this will help aid in passage of the stool. Discussed with patient the possible side effect of Pepto-Bismol being darker colored stool and even though she took it 5 days ago given the amount of stool burden it is likely that she is just now seeing evidence of that side effect of the medication given that her occult stool was negative with a significant amount of stool on the card. Disposition:  DC- Adult Discharges: All of the diagnostic tests were reviewed and questions answered. Diagnosis, care plan and treatment options were discussed. The patient understands the instructions and will follow up as directed. The patients results have been reviewed with them. They have been counseled regarding their diagnosis. The patient and family member patient and daughter verbally convey understanding and agreement of the signs, symptoms, diagnosis, treatment and prognosis and additionally agrees to follow up as recommended with their PCP in 24 - 48 hours. They also agree with the care-plan and convey that all of their questions have been answered. I have also put together some discharge instructions for them that include: 1) educational information regarding their diagnosis, 2) how to care for their diagnosis at home, as well a 3) list of reasons why they would want to return to the ED prior to their follow-up appointment, should their condition change. DISCHARGE PLAN:  1. Discharge Medication List as of 8/27/2022  7:34 PM        2.    Follow-up Information       Follow up With Specialties Details Why Hollie Penny MD [facility]  As needed [address] [facility] [address]  [phone]            3. Return to ED if worse     Diagnosis     Clinical Impression:   1. Constipation, unspecified constipation type    2. Black stool        Attestations:    Aleksandra Marcos, DO        Please note that this dictation was completed with [employer]ay[employer], the computer voice recognition software. Quite often unanticipated grammatical, syntax, homophones, and other interpretive errors are inadvertently transcribed by the computer software. Please disregard these errors. Please excuse any errors that have escaped final proofreading. Thank you.

## 2022-08-28 NOTE — ED NOTES
Patient called and asked about taking more miralax.  [id number] Kaela Rizvi with provider to take an additional dose tonight and in the am and to call her provider in the am

## 2022-09-08 NOTE — PROGRESS NOTES
Etta Lozano is a 80 y.o. female is here for routine f/u. Hx Persistent/chronic rate-controlled AFib on anticoag, cerebrovascular disease, Hyperlipidemia, aortic insufficiency, and HTN. Last seen by Génesis Lira NP 3/2022. She reports occasionally she will feel palpitations. She has not had to take extra BB. Went to ED 8/27/2022 c/o black stool. Hgb stable. Occult blood, stool was negative. Today, no further issues. No falls. She walks with a cane. The patient denies chest pain/ shortness of breath, orthopnea, PND, LE edema, palpitations, syncope, presyncope or fatigue. Patient Active Problem List    Diagnosis Date Noted    Urine retention 07/16/2019    CVD (cerebrovascular disease) 07/14/2019    UTI (urinary tract infection) 07/14/2019    Dizzy 07/14/2019    Essential hypertension 03/20/2018    Constipation     Snoring     Carotid bruit 07/26/2012    Atrial fibrillation (HCC)     Aortic insufficiency     Benign hypertensive heart disease without heart failure     Pure hypercholesterolemia     Mitral insufficiency     Hypothyroid       Julieth Frost MD (Inactive)  Past Medical History:   Diagnosis Date    Anxiety disorder     Aortic insufficiency     Atrial fibrillation (HCC)     Warfarin stopped by PCP for lung lesion    Benign hypertensive 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 The ejection fraction was 45-50%.     HX APPENDECTOMY      HX CATARACT REMOVAL      HX TONSILLECTOMY Allergies   Allergen Reactions    Flagyl [Metronidazole] Nausea and Vomiting    Penicillins Nausea and Vomiting      Family History   Problem Relation Age of Onset    Stroke Mother     Stroke Father       Social History     Socioeconomic History    Marital status:      Spouse name: Not on file    Number of children: Not on file    Years of education: Not on file    Highest education level: Not on file   Occupational History    Not on file   Tobacco Use    Smoking status: Never    Smokeless tobacco: Never   Substance and Sexual Activity    Alcohol use: No    Drug use: No    Sexual activity: Not on file   Other Topics Concern    Not on file   Social History Narrative    Not on file     Social Determinants of Health     Financial Resource Strain: Not on file   Food Insecurity: Not on file   Transportation Needs: Not on file   Physical Activity: Not on file   Stress: Not on file   Social Connections: Not on file   Intimate Partner Violence: Not on file   Housing Stability: Not on file      Current Outpatient Medications   Medication Sig    amLODIPine (NORVASC) 5 mg tablet TAKE 1 TABLET DAILY    triamcinolone acetonide (KENALOG) 0.1 % topical cream     calcium polycarbophiL (FIBERCON) 625 mg tablet Take 625 mg by mouth. CRANBERRY PO Take  by mouth. peppermint oil (IBGARD PO) Take 180 mg by mouth daily. lubiPROStone (Amitiza) 8 mcg capsule Take 8 mcg by mouth daily (with breakfast). meclizine (ANTIVERT) 12.5 mg tablet  (Patient not taking: Reported on 3/14/2022)    dicyclomine (BENTYL) 20 mg tablet Take 1 Tablet by mouth every six (6) hours. (Patient not taking: Reported on 3/14/2022)    ondansetron (Zofran ODT) 4 mg disintegrating tablet Take 1 Tablet by mouth every eight (8) hours as needed for Nausea.  (Patient not taking: Reported on 3/14/2022)    metoprolol tartrate (LOPRESSOR) 25 mg tablet TAKE 1 TABLET BY MOUTH AS NEEDED (Patient taking differently: Take 25 mg by mouth two (2) times daily as needed (Palpitations/ elevated HR or if BP >168). Indications: if SBP >168.)    Bifidobacterium infantis (ALIGN PO) Take  by mouth.    metoprolol succinate (TOPROL-XL) 50 mg XL tablet Take 1 Tab by mouth two (2) times a day. (Patient taking differently: Take 50 mg by mouth daily.)    melatonin 3 mg tablet Take 1 Tab by mouth nightly. Takes for 3 weeks then stops a week before restarting    ipratropium (ATROVENT) 0.03 % nasal spray 2 Sprays daily. Indications: For Allergies (Patient not taking: Reported on 9/3/2021)    polyethylene glycol (MIRALAX) 17 gram packet Take 17 g by mouth as needed. Indications: Patient seldom takes it. EPINEPHrine (EPIPEN) 0.3 mg/0.3 mL injection 0.3 mg by IntraMUSCular route once as needed. (Patient not taking: Reported on 9/3/2021)    multivitamin (ONE A DAY) tablet Take 1 Tab by mouth daily. loratadine (CLARITIN) 10 mg tablet Take 10 mg by mouth daily. rivaroxaban (XARELTO) 15 mg tab tablet Take  by mouth daily (with dinner). atorvastatin (LIPITOR) 20 mg tablet Take 1 Tab by mouth daily. (Patient taking differently: Take 20 mg by mouth nightly.)    omeprazole (PRILOSEC) 20 mg capsule Take 40 mg by mouth daily. Takes 40mg a day    Cholecalciferol, Vitamin D3, (VITAMIN D3) 1,000 unit cap Take  by mouth daily. Taking 5000 units    ALPRAZolam (XANAX) 0.25 mg tablet Take 0.25 mg by mouth nightly. CA CARBONATE/VITAMIN D3/VIT K (VIACTIV PO) Take  by mouth nightly. levothyroxine (SYNTHROID) 25 mcg tablet Take 50 mcg by mouth Daily (before breakfast). No current facility-administered medications for this visit. Review of Symptoms:    CONST  No weight change. No fever, chills, sweats    ENT No visual changes, URI sx, sore throat    CV  See HPI   RESP  No cough, or sputum, wheezing. Also see HPI   GI  No abdominal pain or change in bowel habits. No heartburn or dysphagia. No melena or rectal bleeding.       No dysuria, urgency, frequency, hematuria   MSKEL  No joint pain, swelling. No muscle pain. SKIN  No rash or lesions. NEURO  No headache, syncope, or seizure. No weakness, loss of sensation, or paresthesias. PSYCH  No low mood or depression  No anxiety. HE/LYMPH  No easy bruising, abnormal bleeding, or enlarged glands. Physical ExamPhysical Exam:    There were no vitals taken for this visit. Gen: NAD  HEENT:  PERRL, throat clear  Neck: no adenopathy, no thyromegaly, no JVD   Heart:  irregular,Nl S1S2,  II/VI murmur, no gallop or rub. Lungs:  clear  Abdomen:   Soft, non-tender, bowel sounds are active.    Extremities:  No edema  Pulse: symmetric  Neuro: A&O times 3, No focal neuro deficits    Cardiographics    ECG: afib HR 53 no significant changes     Labs:   Lab Results   Component Value Date/Time    Sodium 133 (L) 08/27/2022 05:00 PM    Sodium 140 07/24/2022 09:40 AM    Sodium 131 (L) 05/26/2022 02:10 AM    Sodium 132 (L) 02/24/2022 01:55 AM    Sodium 137 11/06/2021 01:35 AM    Potassium 3.9 08/27/2022 05:00 PM    Potassium 3.8 07/24/2022 09:40 AM    Potassium 4.0 05/26/2022 02:10 AM    Potassium 3.9 02/24/2022 01:55 AM    Potassium 4.1 11/06/2021 01:35 AM    Chloride 96 (L) 08/27/2022 05:00 PM    Chloride 103 07/24/2022 09:40 AM    Chloride 97 05/26/2022 02:10 AM    Chloride 100 02/24/2022 01:55 AM    Chloride 100 11/06/2021 01:35 AM    CO2 26 08/27/2022 05:00 PM    CO2 28 07/24/2022 09:40 AM    CO2 26 05/26/2022 02:10 AM    CO2 26 02/24/2022 01:55 AM    CO2 28 11/06/2021 01:35 AM    Anion gap 11 08/27/2022 05:00 PM    Anion gap 9 07/24/2022 09:40 AM    Anion gap 8 05/26/2022 02:10 AM    Anion gap 6 02/24/2022 01:55 AM    Anion gap 9 11/06/2021 01:35 AM    Glucose 97 08/27/2022 05:00 PM    Glucose 134 (H) 07/24/2022 09:40 AM    Glucose 90 05/26/2022 02:10 AM    Glucose 96 02/24/2022 01:55 AM    Glucose 90 11/06/2021 01:35 AM    BUN 16 08/27/2022 05:00 PM    BUN 15 07/24/2022 09:40 AM    BUN 21 (H) 05/26/2022 02:10 AM    BUN 22 (H) 02/24/2022 01:55 AM    BUN 19 11/06/2021 01:35 AM    Creatinine 0.72 08/27/2022 05:00 PM    Creatinine 0.68 07/24/2022 09:40 AM    Creatinine 0.73 05/26/2022 02:10 AM    Creatinine 0.71 02/24/2022 01:55 AM    Creatinine 0.74 11/06/2021 01:35 AM    BUN/Creatinine ratio 22 (H) 08/27/2022 05:00 PM    BUN/Creatinine ratio 22 (H) 07/24/2022 09:40 AM    BUN/Creatinine ratio 29 (H) 05/26/2022 02:10 AM    BUN/Creatinine ratio 31 (H) 02/24/2022 01:55 AM    BUN/Creatinine ratio 26 (H) 11/06/2021 01:35 AM    GFR est AA >60 08/27/2022 05:00 PM    GFR est AA >60 07/24/2022 09:40 AM    GFR est AA >60 05/26/2022 02:10 AM    GFR est AA >60 02/24/2022 01:55 AM    GFR est AA >60 11/06/2021 01:35 AM    GFR est non-AA >60 08/27/2022 05:00 PM    GFR est non-AA >60 07/24/2022 09:40 AM    GFR est non-AA >60 05/26/2022 02:10 AM    GFR est non-AA >60 02/24/2022 01:55 AM    GFR est non-AA >60 11/06/2021 01:35 AM    Calcium 9.2 08/27/2022 05:00 PM    Calcium 9.0 07/24/2022 09:40 AM    Calcium 9.0 05/26/2022 02:10 AM    Calcium 9.2 02/24/2022 01:55 AM    Calcium 9.2 11/06/2021 01:35 AM    Bilirubin, total 0.7 08/27/2022 05:00 PM    Bilirubin, total 0.8 07/24/2022 09:40 AM    Bilirubin, total 0.4 05/26/2022 02:10 AM    Bilirubin, total 0.5 02/24/2022 01:55 AM    Bilirubin, total 0.5 11/06/2021 01:35 AM    Alk. phosphatase 107 08/27/2022 05:00 PM    Alk. phosphatase 90 07/24/2022 09:40 AM    Alk. phosphatase 117 05/26/2022 02:10 AM    Alk. phosphatase 108 02/24/2022 01:55 AM    Alk.  phosphatase 111 11/06/2021 01:35 AM    Protein, total 8.3 (H) 08/27/2022 05:00 PM    Protein, total 8.1 07/24/2022 09:40 AM    Protein, total 7.9 05/26/2022 02:10 AM    Protein, total 7.9 02/24/2022 01:55 AM    Protein, total 8.6 (H) 11/06/2021 01:35 AM    Albumin 3.8 08/27/2022 05:00 PM    Albumin 3.7 07/24/2022 09:40 AM    Albumin 3.6 05/26/2022 02:10 AM    Albumin 3.3 (L) 02/24/2022 01:55 AM    Albumin 3.9 11/06/2021 01:35 AM    Globulin 4.5 (H) 08/27/2022 05:00 PM    Globulin 4.4 (H) 07/24/2022 09:40 AM    Globulin 4.3 (H) 05/26/2022 02:10 AM    Globulin 4.6 (H) 02/24/2022 01:55 AM    Globulin 4.7 (H) 11/06/2021 01:35 AM    A-G Ratio 0.8 (L) 08/27/2022 05:00 PM    A-G Ratio 0.8 (L) 07/24/2022 09:40 AM    A-G Ratio 0.8 (L) 05/26/2022 02:10 AM    A-G Ratio 0.7 (L) 02/24/2022 01:55 AM    A-G Ratio 0.8 (L) 11/06/2021 01:35 AM    ALT (SGPT) 41 08/27/2022 05:00 PM    ALT (SGPT) 35 07/24/2022 09:40 AM    ALT (SGPT) 57 05/26/2022 02:10 AM    ALT (SGPT) 33 02/24/2022 01:55 AM    ALT (SGPT) 36 11/06/2021 01:35 AM     Lab Results   Component Value Date/Time    CK 55 07/14/2019 03:12 PM     Lab Results   Component Value Date/Time    Cholesterol, total 134 07/15/2019 05:40 AM    HDL Cholesterol 50 07/15/2019 05:40 AM    LDL, calculated 72.4 07/15/2019 05:40 AM    Triglyceride 58 07/15/2019 05:40 AM    CHOL/HDL Ratio 2.7 07/15/2019 05:40 AM     No results found for this or any previous visit. Assessment:         Patient Active Problem List    Diagnosis Date Noted    Urine retention 07/16/2019    CVD (cerebrovascular disease) 07/14/2019    UTI (urinary tract infection) 07/14/2019    Dizzy 07/14/2019    Essential hypertension 03/20/2018    Constipation     Snoring     Carotid bruit 07/26/2012    Atrial fibrillation (HCC)     Aortic insufficiency     Benign hypertensive heart disease without heart failure     Pure hypercholesterolemia     Mitral insufficiency     Hypothyroid      Echo 7/15/19 with LVEF >70, severe LAE, mild AI, mild MR, normal RVSP. Carotid dopplers 7/15/19 with mild bilat carotid plaque. 7/15/19 Head Ct/MRI with old chronic vasc changes/old infarct. Plan:     Persistent atrial fibrillation ([address]  Echo 7/15/19 with LVEF >70, severe LAE, mild AI, mild MR, normal RVSP. ECG rate controlled afib.    Continue Xarelto  Continue BB----on torpol xl 50 mg daily, stable, defers adjustment as we can run into recurrent palpitation  Discussed should she experience worsening fatigue can consider decreasing BB. Hgb 11.7 in 8/2022; Serum Cr.0.72 in 8/2022    Essential hypertension  Controlled with current therapy    Aortic valve insufficiency, etiology of cardiac valve disease unspecified  Mitral valve insufficiency, unspecified etiology  Echo 7/15/19 with LVEF >70, severe LAE, mild AI, mild MR, normal RVSP. Mixed hyperlipidemia  5/2022 LDL 66 On atorva 20 mg daily Labs and lipids per PCP      Continue current care and f/u in 6 months.     Johnson Ascencio NP

## 2022-09-14 ENCOUNTER — OFFICE VISIT (OUTPATIENT)
Dept: CARDIOLOGY CLINIC | Age: 87
End: 2022-09-14
Payer: MEDICARE

## 2022-09-14 VITALS
HEART RATE: 53 BPM | HEIGHT: 63 IN | BODY MASS INDEX: 15.77 KG/M2 | DIASTOLIC BLOOD PRESSURE: 64 MMHG | TEMPERATURE: 97 F | WEIGHT: 89 LBS | SYSTOLIC BLOOD PRESSURE: 120 MMHG | RESPIRATION RATE: 20 BRPM | OXYGEN SATURATION: 99 %

## 2022-09-14 DIAGNOSIS — I10 ESSENTIAL HYPERTENSION: ICD-10-CM

## 2022-09-14 DIAGNOSIS — E78.2 MIXED HYPERLIPIDEMIA: ICD-10-CM

## 2022-09-14 DIAGNOSIS — I48.19 PERSISTENT ATRIAL FIBRILLATION (HCC): Primary | ICD-10-CM

## 2022-09-14 DIAGNOSIS — I35.1 AORTIC VALVE INSUFFICIENCY, ETIOLOGY OF CARDIAC VALVE DISEASE UNSPECIFIED: ICD-10-CM

## 2022-09-14 DIAGNOSIS — I34.0 MITRAL VALVE INSUFFICIENCY, UNSPECIFIED ETIOLOGY: ICD-10-CM

## 2022-09-14 DIAGNOSIS — E78.00 PURE HYPERCHOLESTEROLEMIA: ICD-10-CM

## 2022-09-14 PROCEDURE — 1090F PRES/ABSN URINE INCON ASSESS: CPT | Performed by: NURSE PRACTITIONER

## 2022-09-14 PROCEDURE — 93000 ELECTROCARDIOGRAM COMPLETE: CPT | Performed by: NURSE PRACTITIONER

## 2022-09-14 PROCEDURE — G8419 CALC BMI OUT NRM PARAM NOF/U: HCPCS | Performed by: NURSE PRACTITIONER

## 2022-09-14 PROCEDURE — 99214 OFFICE O/P EST MOD 30 MIN: CPT | Performed by: NURSE PRACTITIONER

## 2022-09-14 PROCEDURE — 1101F PT FALLS ASSESS-DOCD LE1/YR: CPT | Performed by: NURSE PRACTITIONER

## 2022-09-14 PROCEDURE — G8536 NO DOC ELDER MAL SCRN: HCPCS | Performed by: NURSE PRACTITIONER

## 2022-09-14 PROCEDURE — G8427 DOCREV CUR MEDS BY ELIG CLIN: HCPCS | Performed by: NURSE PRACTITIONER

## 2022-09-14 PROCEDURE — G8432 DEP SCR NOT DOC, RNG: HCPCS | Performed by: NURSE PRACTITIONER

## 2022-09-14 PROCEDURE — 1123F ACP DISCUSS/DSCN MKR DOCD: CPT | Performed by: NURSE PRACTITIONER

## 2022-09-14 RX ORDER — METOPROLOL SUCCINATE 50 MG/1
50 TABLET, EXTENDED RELEASE ORAL DAILY
Qty: 90 TABLET | Refills: 1
Start: 2022-09-14

## 2022-09-14 NOTE — PROGRESS NOTES
Identified pt with two pt identifiers(name and ). Reviewed record in preparation for visit and have obtained necessary documentation. Chief Complaint   Patient presents with    Irregular Heart Beat     Afib      Hypertension      Vitals:    22 1048   BP: (!) 120/56   Pulse: (!) 53   Resp: 20   Temp: 97 °F (36.1 °C)   TempSrc: Temporal   SpO2: 99%   Weight: 89 lb (40.4 kg)   Height: 5' 3\" (1.6 m)   PainSc:   0 - No pain       Medications reviewed/approved by provider. Health Maintenance Review: Patient reminded of \"due or due soon\" health maintenance. I have asked the patient to contact his/her primary care provider (PCP) for follow-up on his/her health maintenance. Coordination of Care Questionnaire:  :   1) Have you been to an emergency room, urgent care, or hospitalized since your last visit? If yes, where when, and reason for visit? no       2. Have seen or consulted any other health care provider since your last visit? If yes, where when, and reason for visit? YES Dr. Wagner [facility] PCP      Patient is accompanied by self I have received verbal consent from Etta Lozano to discuss any/all medical information while they are present in the room.

## 2022-12-24 ENCOUNTER — HOSPITAL ENCOUNTER (EMERGENCY)
Age: 87
Discharge: HOME | End: 2022-12-24
Attending: EMERGENCY MEDICINE
Payer: MEDICARE

## 2022-12-24 ENCOUNTER — APPOINTMENT (OUTPATIENT)
Dept: CT IMAGING | Age: 87
End: 2022-12-24
Attending: EMERGENCY MEDICINE
Payer: MEDICARE

## 2022-12-24 VITALS
DIASTOLIC BLOOD PRESSURE: 67 MMHG | HEART RATE: 65 BPM | TEMPERATURE: 98.1 F | RESPIRATION RATE: 16 BRPM | OXYGEN SATURATION: 99 % | SYSTOLIC BLOOD PRESSURE: 160 MMHG

## 2022-12-24 DIAGNOSIS — R40.4 TRANSIENT ALTERATION OF AWARENESS: Primary | ICD-10-CM

## 2022-12-24 DIAGNOSIS — E87.1 HYPONATREMIA: ICD-10-CM

## 2022-12-24 LAB
ANION GAP SERPL CALC-SCNC: 6 MMOL/L (ref 5–15)
APPEARANCE UR: CLEAR
ATRIAL RATE: 72 BPM
BACTERIA URNS QL MICRO: NEGATIVE /HPF
BASOPHILS # BLD: 0 K/UL (ref 0–0.1)
BASOPHILS NFR BLD: 0 % (ref 0–1)
BILIRUB UR QL: NEGATIVE
BUN SERPL-MCNC: 13 MG/DL (ref 6–20)
BUN/CREAT SERPL: 20 (ref 12–20)
CALCIUM SERPL-MCNC: 9.2 MG/DL (ref 8.5–10.1)
CALCULATED R AXIS, ECG10: 63 DEGREES
CALCULATED T AXIS, ECG11: 52 DEGREES
CHLORIDE SERPL-SCNC: 90 MMOL/L (ref 97–108)
CO2 SERPL-SCNC: 29 MMOL/L (ref 21–32)
COLOR UR: ABNORMAL
CREAT SERPL-MCNC: 0.64 MG/DL (ref 0.55–1.02)
DIAGNOSIS, 93000: NORMAL
DIFFERENTIAL METHOD BLD: ABNORMAL
EOSINOPHIL # BLD: 0 K/UL (ref 0–0.4)
EOSINOPHIL NFR BLD: 0 % (ref 0–7)
EPITH CASTS URNS QL MICRO: ABNORMAL /LPF
ERYTHROCYTE [DISTWIDTH] IN BLOOD BY AUTOMATED COUNT: 13.3 % (ref 11.5–14.5)
GLUCOSE BLD STRIP.AUTO-MCNC: 84 MG/DL (ref 65–117)
GLUCOSE SERPL-MCNC: 85 MG/DL (ref 65–100)
GLUCOSE UR STRIP.AUTO-MCNC: NEGATIVE MG/DL
HCT VFR BLD AUTO: 34.6 % (ref 35–47)
HGB BLD-MCNC: 12 G/DL (ref 11.5–16)
HGB UR QL STRIP: ABNORMAL
IMM GRANULOCYTES # BLD AUTO: 0 K/UL (ref 0–0.04)
IMM GRANULOCYTES NFR BLD AUTO: 0 % (ref 0–0.5)
KETONES UR QL STRIP.AUTO: NEGATIVE MG/DL
LEUKOCYTE ESTERASE UR QL STRIP.AUTO: ABNORMAL
LYMPHOCYTES # BLD: 1.3 K/UL (ref 0.8–3.5)
LYMPHOCYTES NFR BLD: 15 % (ref 12–49)
MCH RBC QN AUTO: 29.3 PG (ref 26–34)
MCHC RBC AUTO-ENTMCNC: 34.7 G/DL (ref 30–36.5)
MCV RBC AUTO: 84.6 FL (ref 80–99)
MONOCYTES # BLD: 0.8 K/UL (ref 0–1)
MONOCYTES NFR BLD: 10 % (ref 5–13)
NEUTS SEG # BLD: 6.1 K/UL (ref 1.8–8)
NEUTS SEG NFR BLD: 75 % (ref 32–75)
NITRITE UR QL STRIP.AUTO: NEGATIVE
NRBC # BLD: 0 K/UL (ref 0–0.01)
NRBC BLD-RTO: 0 PER 100 WBC
PH UR STRIP: 7 [PH] (ref 5–8)
PLATELET # BLD AUTO: 257 K/UL (ref 150–400)
PMV BLD AUTO: 8.7 FL (ref 8.9–12.9)
POTASSIUM SERPL-SCNC: 3.6 MMOL/L (ref 3.5–5.1)
PROT UR STRIP-MCNC: NEGATIVE MG/DL
Q-T INTERVAL, ECG07: 446 MS
QRS DURATION, ECG06: 82 MS
QTC CALCULATION (BEZET), ECG08: 448 MS
RBC # BLD AUTO: 4.09 M/UL (ref 3.8–5.2)
RBC #/AREA URNS HPF: ABNORMAL /HPF (ref 0–5)
SERVICE CMNT-IMP: NORMAL
SODIUM SERPL-SCNC: 125 MMOL/L (ref 136–145)
SP GR UR REFRACTOMETRY: <1.005 (ref 1–1.03)
UA: UC IF INDICATED,UAUC: ABNORMAL
UROBILINOGEN UR QL STRIP.AUTO: 0.2 EU/DL (ref 0.2–1)
VENTRICULAR RATE, ECG03: 61 BPM
WBC # BLD AUTO: 8.3 K/UL (ref 3.6–11)
WBC URNS QL MICRO: ABNORMAL /HPF (ref 0–4)

## 2022-12-24 PROCEDURE — 93005 ELECTROCARDIOGRAM TRACING: CPT

## 2022-12-24 PROCEDURE — 87077 CULTURE AEROBIC IDENTIFY: CPT

## 2022-12-24 PROCEDURE — 96361 HYDRATE IV INFUSION ADD-ON: CPT

## 2022-12-24 PROCEDURE — 77030012862 HC BG URIN LEG BARD -A

## 2022-12-24 PROCEDURE — 80048 BASIC METABOLIC PNL TOTAL CA: CPT

## 2022-12-24 PROCEDURE — 82962 GLUCOSE BLOOD TEST: CPT

## 2022-12-24 PROCEDURE — 77030034849

## 2022-12-24 PROCEDURE — 96360 HYDRATION IV INFUSION INIT: CPT

## 2022-12-24 PROCEDURE — 99284 EMERGENCY DEPT VISIT MOD MDM: CPT

## 2022-12-24 PROCEDURE — 36415 COLL VENOUS BLD VENIPUNCTURE: CPT

## 2022-12-24 PROCEDURE — 74011250636 HC RX REV CODE- 250/636: Performed by: EMERGENCY MEDICINE

## 2022-12-24 PROCEDURE — 85025 COMPLETE CBC W/AUTO DIFF WBC: CPT

## 2022-12-24 PROCEDURE — 87086 URINE CULTURE/COLONY COUNT: CPT

## 2022-12-24 PROCEDURE — 87186 SC STD MICRODIL/AGAR DIL: CPT

## 2022-12-24 PROCEDURE — 51702 INSERT TEMP BLADDER CATH: CPT

## 2022-12-24 PROCEDURE — 81001 URINALYSIS AUTO W/SCOPE: CPT

## 2022-12-24 PROCEDURE — 70450 CT HEAD/BRAIN W/O DYE: CPT

## 2022-12-24 RX ADMIN — SODIUM CHLORIDE 500 ML: 9 INJECTION, SOLUTION INTRAVENOUS at 13:50

## 2022-12-24 NOTE — ED TRIAGE NOTES
Daughter states pt woke dizzy and confused this morning , she gave her some meclizine with no relief. LTKW 2130 last night.

## 2022-12-24 NOTE — ED NOTES
Pt has chronic ledbetter. Replaced by [facility] on December 13, 2022. MD order replace ledbetter catheter.

## 2022-12-24 NOTE — ED PROVIDER NOTES
EMERGENCY DEPARTMENT HISTORY AND PHYSICAL EXAM      Date: 12/24/2022  Patient Name: Shwetha Isaac    History of Presenting Illness     Chief Complaint   Patient presents with    Altered mental status       History Provided By: Patient and Patient's Daughter    HPI: Shwetha Isaac, 80 y.o. female with PMHx significant for chronic A. fib, hypertension, hypothyroid, high cholesterol, presents via EMS to the ED with cc of altered mental status. Daughters report that this morning patient was slightly dizzy and they gave her meclizine at 7:15 AM.  He noted throughout the morning she was very confused beyond baseline. Her daughter asked her to get a glass of water and she got a calendar. He reports she had problems recognizing them. No extremity weakness. He said at baseline she is normally alert and oriented with some mild confusion to time and current events. Patient is without complaints. She has a chronic indwelling Cameron catheter that was last changed on December 13. No fevers. No chest pain. No shortness of breath. No abdominal pain. PMHx: Significant for chronic A. fib, hypertension hypothyroid, high cholesterol  PSHx: Significant for appendectomy, cataract surgery  Social Hx: Non-smoker. Nondrinker. There are no other complaints, changes, or physical findings at this time. PCP: Sonia Laguerre MD (Inactive)    No current facility-administered medications on file prior to encounter. Current Outpatient Medications on File Prior to Encounter   Medication Sig Dispense Refill    metoprolol succinate (TOPROL-XL) 50 mg XL tablet Take 1 Tablet by mouth daily. 90 Tablet 1    amLODIPine (NORVASC) 5 mg tablet TAKE 1 TABLET DAILY 90 Tablet 3    triamcinolone acetonide (KENALOG) 0.1 % topical cream       calcium polycarbophiL (FIBERCON) 625 mg tablet Take 625 mg by mouth. CRANBERRY PO Take  by mouth. peppermint oil (IBGARD PO) Take 180 mg by mouth daily.       lubiPROStone (AMITIZA) 8 mcg capsule Take 8 mcg by mouth daily (with breakfast). metoprolol tartrate (LOPRESSOR) 25 mg tablet TAKE 1 TABLET BY MOUTH AS NEEDED (Patient taking differently: Take 25 mg by mouth two (2) times daily as needed (Palpitations/ elevated HR or if BP >168). Indications: if SBP >168.) 30 Tab 1    Bifidobacterium infantis (ALIGN PO) Take  by mouth.      melatonin 3 mg tablet Take 1 Tab by mouth nightly. Takes for 3 weeks then stops a week before restarting      polyethylene glycol (MIRALAX) 17 gram packet Take 17 g by mouth as needed. Indications: Patient seldom takes it.      multivitamin (ONE A DAY) tablet Take 1 Tab by mouth daily. rivaroxaban (XARELTO) 15 mg tab tablet Take  by mouth daily (with dinner). atorvastatin (LIPITOR) 20 mg tablet Take 1 Tab by mouth daily. (Patient taking differently: Take 20 mg by mouth nightly.) 60 Tab 3    omeprazole (PRILOSEC) 20 mg capsule Take 40 mg by mouth daily. Takes 40mg a day      cholecalciferol (VITAMIN D3) 25 mcg (1,000 unit) cap Take  by mouth daily. Taking 5000 units      ALPRAZolam (XANAX) 0.25 mg tablet Take 0.25 mg by mouth nightly. CA CARBONATE/VITAMIN D3/VIT K (VIACTIV PO) Take  by mouth nightly. levothyroxine (SYNTHROID) 25 mcg tablet Take 25 mcg by mouth two (2) times a day.          Past History     Past Medical History:  Past Medical History:   Diagnosis Date    Anxiety disorder     Aortic insufficiency     Atrial fibrillation (HCC)     Warfarin stopped by PCP for lung lesion    Benign hypertensive heart disease without heart failure     Carotid bruit 7/26/2012    Constipation     Essential hypertension     Heart disease     HTN (hypertension)     Hypothyroid     Memory disorder     [facility])     Mitral insufficiency     Pure hypercholesterolemia     Snoring     Unspecified cerebral artery occlusion with cerebral infarction        Past Surgical History:  Past Surgical History:   Procedure Laterality Date    ECHO 2D ADULT  2009    mild LV systolic dysfunction, normal chamber dimensions, mild aortic regurgitation and mild mitral regurgitation. The ejection fraction was 45-50%. HX APPENDECTOMY      HX CATARACT REMOVAL      HX TONSILLECTOMY         Family History:  Family History   Problem Relation Age of Onset    Stroke Mother     Stroke Father        Social History:  Social History     Tobacco Use    Smoking status: Never    Smokeless tobacco: Never   Substance Use Topics    Alcohol use: No    Drug use: No       Allergies: Allergies   Allergen Reactions    Flagyl [Metronidazole] Nausea and Vomiting    Penicillins Nausea and Vomiting         Review of Systems   Review of Systems   Constitutional:  Positive for fatigue. Negative for activity change, chills and fever. HENT:  Negative for congestion and sore throat. Eyes:  Negative for pain and redness. Respiratory:  Negative for cough, chest tightness and shortness of breath. Cardiovascular:  Negative for chest pain and palpitations. Gastrointestinal:  Negative for abdominal pain, diarrhea, nausea and vomiting. Genitourinary:  Negative for dysuria, frequency and urgency. Musculoskeletal:  Negative for back pain and neck pain. Skin:  Negative for rash. Neurological:  Negative for syncope, light-headedness and headaches. Psychiatric/Behavioral:  Positive for confusion. All other systems reviewed and are negative. Physical Exam   Physical Exam  Vitals and nursing note reviewed. Constitutional:       General: She is not in acute distress. Appearance: She is well-developed. She is not diaphoretic. HENT:      Head: Normocephalic and atraumatic. Nose: Nose normal.      Mouth/Throat:      Pharynx: No oropharyngeal exudate. Eyes:      General: No scleral icterus. Conjunctiva/sclera: Conjunctivae normal.      Pupils: Pupils are equal, round, and reactive to light. Neck:      Thyroid: No thyromegaly. Vascular: No JVD. Trachea: No tracheal deviation. Cardiovascular:      Rate and Rhythm: Normal rate. Rhythm irregular. Heart sounds: No murmur heard. No friction rub. No gallop. Pulmonary:      Effort: Pulmonary effort is normal. No respiratory distress. Breath sounds: Normal breath sounds. No stridor. No wheezing or rales. Abdominal:      General: Bowel sounds are normal. There is no distension. Palpations: Abdomen is soft. Tenderness: There is no abdominal tenderness. There is no guarding or rebound. Musculoskeletal:         General: Normal range of motion. Cervical back: Normal range of motion and neck supple. Lymphadenopathy:      Cervical: No cervical adenopathy. Skin:     General: Skin is warm and dry. Findings: No erythema or rash. Neurological:      Mental Status: She is alert. Cranial Nerves: No cranial nerve deficit. Motor: No abnormal muscle tone. Coordination: Coordination normal.      Comments: Alert to person only. Confused to place and time and current events. Follows simple commands without difficulty. Moves all extremities. EOMI. No facial asymmetry.    Psychiatric:         Behavior: Behavior normal.           Diagnostic Study Results     Labs -     Recent Results (from the past 12 hour(s))   GLUCOSE, POC    Collection Time: 12/24/22  1:30 PM   Result Value Ref Range    Glucose (POC) 84 65 - 117 mg/dL    Performed by Irena Fraire (AAKASH)    CBC WITH AUTOMATED DIFF    Collection Time: 12/24/22  1:31 PM   Result Value Ref Range    WBC 8.3 3.6 - 11.0 K/uL    RBC 4.09 3.80 - 5.20 M/uL    HGB 12.0 11.5 - 16.0 g/dL    HCT 34.6 (L) 35.0 - 47.0 %    MCV 84.6 80.0 - 99.0 FL    MCH 29.3 26.0 - 34.0 PG    MCHC 34.7 30.0 - 36.5 g/dL    RDW 13.3 11.5 - 14.5 %    PLATELET 101 223 - 307 K/uL    MPV 8.7 (L) 8.9 - 12.9 FL    NRBC 0.0 0  WBC    ABSOLUTE NRBC 0.00 0.00 - 0.01 K/uL    NEUTROPHILS 75 32 - 75 %    LYMPHOCYTES 15 12 - 49 %    MONOCYTES 10 5 - 13 %    EOSINOPHILS 0 0 - 7 % BASOPHILS 0 0 - 1 %    IMMATURE GRANULOCYTES 0 0.0 - 0.5 %    ABS. NEUTROPHILS 6.1 1.8 - 8.0 K/UL    ABS. LYMPHOCYTES 1.3 0.8 - 3.5 K/UL    ABS. MONOCYTES 0.8 0.0 - 1.0 K/UL    ABS. EOSINOPHILS 0.0 0.0 - 0.4 K/UL    ABS. BASOPHILS 0.0 0.0 - 0.1 K/UL    ABS. IMM.  GRANS. 0.0 0.00 - 0.04 K/UL    DF AUTOMATED     METABOLIC PANEL, BASIC    Collection Time: 12/24/22  1:31 PM   Result Value Ref Range    Sodium 125 (L) 136 - 145 mmol/L    Potassium 3.6 3.5 - 5.1 mmol/L    Chloride 90 (L) 97 - 108 mmol/L    CO2 29 21 - 32 mmol/L    Anion gap 6 5 - 15 mmol/L    Glucose 85 65 - 100 mg/dL    BUN 13 6 - 20 MG/DL    Creatinine 0.64 0.55 - 1.02 MG/DL    BUN/Creatinine ratio 20 12 - 20      eGFR >60 >60 ml/min/1.73m2    Calcium 9.2 8.5 - 10.1 MG/DL   EKG, 12 LEAD, INITIAL    Collection Time: 12/24/22  1:36 PM   Result Value Ref Range    Ventricular Rate 61 BPM    Atrial Rate 72 BPM    QRS Duration 82 ms    Q-T Interval 446 ms    QTC Calculation (Bezet) 448 ms    Calculated R Axis 63 degrees    Calculated T Axis 52 degrees    Diagnosis       Atrial fibrillation  Voltage criteria for left ventricular hypertrophy  ST depression, consider subendocardial injury  Abnormal ECG  When compared with ECG of 24-JUL-2022 09:12,  Criteria for Septal infarct are no longer present  Nonspecific T wave abnormality, improved in Inferior leads  T wave inversion no longer evident in Lateral leads     URINALYSIS W/ REFLEX CULTURE    Collection Time: 12/24/22  2:15 PM    Specimen: Urine   Result Value Ref Range    Color YELLOW/STRAW      Appearance CLEAR CLEAR      Specific gravity <1.005 1.003 - 1.030    pH (UA) 7.0 5.0 - 8.0      Protein Negative NEG mg/dL    Glucose Negative NEG mg/dL    Ketone Negative NEG mg/dL    Bilirubin Negative NEG      Blood SMALL (A) NEG      Urobilinogen 0.2 0.2 - 1.0 EU/dL    Nitrites Negative NEG      Leukocyte Esterase LARGE (A) NEG      WBC 10-20 0 - 4 /hpf    RBC 0-5 0 - 5 /hpf    Epithelial cells FEW FEW /lpf Bacteria Negative NEG /hpf    UA:UC IF INDICATED URINE CULTURE ORDERED (A) CNI         Radiologic Studies -   CT HEAD WO CONT   Final Result   1. No evidence of acute infarct or intracranial hemorrhage. 2. Periventricular white matter disease is likely secondary to chronic small   vessel ischemic changes. 3. Bilateral chronic cerebellar infarcts. CT Results  (Last 48 hours)                 12/24/22 1412  CT HEAD WO CONT Final result    Impression:  1. No evidence of acute infarct or intracranial hemorrhage. 2. Periventricular white matter disease is likely secondary to chronic small   vessel ischemic changes. 3. Bilateral chronic cerebellar infarcts. Narrative: Indication:  confusion        Comparison: July 2019       Findings: 5 mm axial images were obtained from the skull base through the   vertex. CT dose reduction was achieved through the use of a standardized protocol   tailored for this examination and automatic exposure control for dose   modulation. The ventricles and cortical sulci are prominent, compatible with age related   volume loss. There is no evidence of intracranial hemorrhage, mass, mass effect,   or acute infarct. There is periventricular white matter disease. No extra-axial   fluid collections are seen. The visualized paranasal sinuses and mastoid air   cells are clear. The orbital structures are unremarkable. No osseous   abnormalities are seen. CXR Results  (Last 48 hours)      None              Medical Decision Making   I am the first provider for this patient. I reviewed the vital signs, available nursing notes, past medical history, past surgical history, family history and social history. Vital Signs-Reviewed the patient's vital signs.   Patient Vitals for the past 12 hrs:   Pulse Resp BP SpO2   12/24/22 1347 68 15 (!) 160/67 98 %       Pulse Oximetry Analysis - 98% on RA    Cardiac Monitor:   Rate: 61 bpm  Rhythm: Atrial Fibrillation        ED EKG interpretation:13:36  Rhythm: atrial fib; and irregular. Rate (approx.): 61; Axis: normal; NE Interval: n/a; QRS interval: normal ; ST/T wave: non-specific changes; This EKG was interpreted by Edil Carlson MD,ED Provider. Records Reviewed: Nursing Notes and Old Medical Records    Provider Notes (Medical Decision Making):   DDX:    ED Course:   Initial assessment performed. The patients presenting problems have been discussed, and they are in agreement with the care plan formulated and outlined with them. I have encouraged them to ask questions as they arise throughout their visit. ED EKG interpretation:14:00  Rhythm: sinus tachycardia; and regular . Rate (approx.): 106; Axis: normal; NE Interval: normal; QRS interval: RBBB ; ST/T wave: non-specific changes; This EKG was interpreted by Edil Carlson MD,ED Provider. PROGRESS NOTE    14:48 Pt reevaluated. Family reports she is back to baseline. She is recognizing them and speaking appropriately. Suspect possible meclizine side effect as cause of symptoms. CBC unremarkable. UA negative for bacteria. U CX sent. BMP remarkable for sodium of 125 but otherwise normal.  Sodium previously 131. Hydrating with 500 cc IV fluids. No acute EKG changes. Written by Edil Carlson MD     Progress note:    Pt noted to be feeling better , ready for discharge. Updated pt and/or family on all final lab and imaging findings. Will follow up as instructed. All questions have been answered, pt voiced understanding and agreement with plan. Specific return precautions provided as well as instructions to return to the ED should sx worsen at any time. Vital signs stable for discharge.        I have also put together some discharge instructions for them that include: 1) educational information regarding their diagnosis, 2) how to care for their diagnosis at home, as well a 3) list of reasons why they would want to return to the ED prior to their follow-up appointment, should their condition change. Written by Tej Vail MD        Critical Care Time:   0    Disposition:  Discharge    PLAN:  1. Current Discharge Medication List        2. Follow-up Information    None       Return to ED if worse     Diagnosis     Clinical Impression:   1. Transient alteration of awareness    2. Hyponatremia              Please note that this dictation was completed with Peerlyst, the computer voice recognition software. Quite often unanticipated grammatical, syntax, homophones, and other interpretive errors are inadvertently transcribed by the computer software. Please disregard these errors. Please excuse any errors that have escaped final proofreading.

## 2022-12-25 LAB
BACTERIA SPEC CULT: ABNORMAL
CC UR VC: ABNORMAL
SERVICE CMNT-IMP: ABNORMAL

## 2022-12-27 LAB
ATRIAL RATE: 72 BPM
BACTERIA SPEC CULT: ABNORMAL
CALCULATED R AXIS, ECG10: 63 DEGREES
CALCULATED T AXIS, ECG11: 52 DEGREES
CC UR VC: ABNORMAL
DIAGNOSIS, 93000: NORMAL
Q-T INTERVAL, ECG07: 446 MS
QRS DURATION, ECG06: 82 MS
QTC CALCULATION (BEZET), ECG08: 448 MS
SERVICE CMNT-IMP: ABNORMAL
VENTRICULAR RATE, ECG03: 61 BPM

## 2022-12-27 NOTE — PROGRESS NOTES
Culture growing GNR. Pt seen and dx with TIA. Her daughter would like to wait to see what the speciation and sensitivities result. Please follow up with daughter when culture completely resulted. Daughter notes she is feeling better and feels like this is a medication problem.   Rina Millan MD

## 2023-01-01 NOTE — PROGRESS NOTES
Case discussed with patient and her daughter. Patient is doing well, no fever, no new UTI symptoms. Will defer treatment at this time due to recent drug reaction and no symptoms approx 1 week after ED visit.

## 2023-07-17 RX ORDER — AMLODIPINE BESYLATE 5 MG/1
TABLET ORAL
Qty: 90 TABLET | Refills: 0 | Status: SHIPPED | OUTPATIENT
Start: 2023-07-17

## 2023-08-28 NOTE — PROGRESS NOTES
[address] [facility]     [address] [place] [address] [place]  [address] [place] [place] [address], [facility] [address] Marcus   [address] [facility], [address] [place] [address], [address] [place] [address]     Subjective:          David Cortes is a 80 y.o. female is here for routine f/u. Hx Persistent/chronic rate-controlled AFib on  anticoag, cerebrovascular disease, Hyperlipidemia, aortic insufficiency, and HTN. Last seen by Promise Sanford NP 3/2022. She reports occasionally she will feel palpitations. She has not had to take extra BB. Went to ED 8/27/2022 c/o black stool. Hgb stable. Occult blood, stool was negative. Last seen by me in 9/2022:     No specific CV complaints. She is on [facility], reports no melena, hematuria, or obvious signs of bleeding. No falls. The patient denies chest pain/ shortness of breath, orthopnea, PND, LE edema, palpitations, syncope, presyncope or fatigue. Patient Active Problem List    Diagnosis Date Noted    Urine retention 07/16/2019    Benign hypertensive heart disease without heart failure     Mitral insufficiency     Aortic insufficiency     Constipation     Atrial fibrillation (HCC)     Hypothyroid     Pure hypercholesterolemia     CVD (cerebrovascular disease) 07/14/2019    Dizzy 07/14/2019    UTI (urinary tract infection) 07/14/2019    Essential hypertension 03/20/2018    Snoring     Carotid bruit 07/26/2012      No primary care provider on file. No past medical history on file. No past surgical history on file. Allergies   Allergen Reactions    Metronidazole Nausea And Vomiting    Penicillins Nausea And Vomiting      No family history on file. Social History     Socioeconomic History    Marital status:       Spouse name: Not on file    Number of children: Not on file    Years of education: Not on file    Highest education level: Not on file   Occupational History    Not on file   Tobacco Use    Smoking status: Not on file    Smokeless tobacco: Not on file   Substance

## 2023-09-01 ENCOUNTER — OFFICE VISIT (OUTPATIENT)
Age: 88
End: 2023-09-01
Payer: MEDICARE

## 2023-09-01 VITALS
DIASTOLIC BLOOD PRESSURE: 62 MMHG | BODY MASS INDEX: 17.54 KG/M2 | OXYGEN SATURATION: 98 % | HEIGHT: 63 IN | SYSTOLIC BLOOD PRESSURE: 126 MMHG | TEMPERATURE: 97.7 F | RESPIRATION RATE: 20 BRPM | HEART RATE: 55 BPM | WEIGHT: 99 LBS

## 2023-09-01 DIAGNOSIS — I35.1 NONRHEUMATIC AORTIC (VALVE) INSUFFICIENCY: ICD-10-CM

## 2023-09-01 DIAGNOSIS — I48.19 OTHER PERSISTENT ATRIAL FIBRILLATION (HCC): Primary | ICD-10-CM

## 2023-09-01 DIAGNOSIS — E78.2 MIXED HYPERLIPIDEMIA: ICD-10-CM

## 2023-09-01 DIAGNOSIS — I10 ESSENTIAL (PRIMARY) HYPERTENSION: ICD-10-CM

## 2023-09-01 DIAGNOSIS — E78.00 PURE HYPERCHOLESTEROLEMIA, UNSPECIFIED: ICD-10-CM

## 2023-09-01 DIAGNOSIS — I34.0 NONRHEUMATIC MITRAL (VALVE) INSUFFICIENCY: ICD-10-CM

## 2023-09-01 DIAGNOSIS — I67.9 CEREBROVASCULAR DISEASE, UNSPECIFIED: ICD-10-CM

## 2023-09-01 PROCEDURE — G8419 CALC BMI OUT NRM PARAM NOF/U: HCPCS | Performed by: NURSE PRACTITIONER

## 2023-09-01 PROCEDURE — G8427 DOCREV CUR MEDS BY ELIG CLIN: HCPCS | Performed by: NURSE PRACTITIONER

## 2023-09-01 PROCEDURE — 1090F PRES/ABSN URINE INCON ASSESS: CPT | Performed by: NURSE PRACTITIONER

## 2023-09-01 PROCEDURE — 1036F TOBACCO NON-USER: CPT | Performed by: NURSE PRACTITIONER

## 2023-09-01 PROCEDURE — 93000 ELECTROCARDIOGRAM COMPLETE: CPT | Performed by: NURSE PRACTITIONER

## 2023-09-01 PROCEDURE — 99214 OFFICE O/P EST MOD 30 MIN: CPT | Performed by: NURSE PRACTITIONER

## 2023-09-01 PROCEDURE — 1123F ACP DISCUSS/DSCN MKR DOCD: CPT | Performed by: NURSE PRACTITIONER

## 2023-09-01 RX ORDER — ERYTHROMYCIN 5 MG/G
OINTMENT OPHTHALMIC NIGHTLY
COMMUNITY
Start: 2023-03-28 | End: 2024-03-27

## 2023-09-01 RX ORDER — DONEPEZIL HYDROCHLORIDE 5 MG/1
5 TABLET, FILM COATED ORAL DAILY
Qty: 30 TABLET | Refills: 11 | COMMUNITY
Start: 2023-03-28 | End: 2024-03-27

## 2023-09-01 RX ORDER — MAGNESIUM OXIDE/MAG AA CHELATE 300 MG
300 CAPSULE ORAL DAILY
COMMUNITY

## 2023-09-01 ASSESSMENT — PATIENT HEALTH QUESTIONNAIRE - PHQ9
SUM OF ALL RESPONSES TO PHQ QUESTIONS 1-9: 0
2. FEELING DOWN, DEPRESSED OR HOPELESS: 0
SUM OF ALL RESPONSES TO PHQ QUESTIONS 1-9: 0
SUM OF ALL RESPONSES TO PHQ9 QUESTIONS 1 & 2: 0
1. LITTLE INTEREST OR PLEASURE IN DOING THINGS: 0
SUM OF ALL RESPONSES TO PHQ QUESTIONS 1-9: 0
SUM OF ALL RESPONSES TO PHQ QUESTIONS 1-9: 0

## 2023-12-26 ENCOUNTER — TELEPHONE (OUTPATIENT)
Age: 88
End: 2023-12-26